# Patient Record
Sex: FEMALE | Race: WHITE | NOT HISPANIC OR LATINO | Employment: OTHER | URBAN - NONMETROPOLITAN AREA
[De-identification: names, ages, dates, MRNs, and addresses within clinical notes are randomized per-mention and may not be internally consistent; named-entity substitution may affect disease eponyms.]

---

## 2021-10-03 ENCOUNTER — HOSPITAL ENCOUNTER (EMERGENCY)
Facility: HOSPITAL | Age: 56
Discharge: LEFT BEFORE DISCHARGE | End: 2021-10-03

## 2021-10-03 VITALS
HEART RATE: 113 BPM | SYSTOLIC BLOOD PRESSURE: 128 MMHG | DIASTOLIC BLOOD PRESSURE: 87 MMHG | OXYGEN SATURATION: 92 % | TEMPERATURE: 97.7 F | RESPIRATION RATE: 12 BRPM

## 2021-10-03 LAB
QT INTERVAL: 340 MS
QTC INTERVAL: 466 MS

## 2021-10-03 PROCEDURE — 99211 OFF/OP EST MAY X REQ PHY/QHP: CPT

## 2021-10-03 PROCEDURE — 93005 ELECTROCARDIOGRAM TRACING: CPT

## 2021-10-03 PROCEDURE — 93010 ELECTROCARDIOGRAM REPORT: CPT | Performed by: INTERNAL MEDICINE

## 2021-10-03 NOTE — ED NOTES
"Pt states \" I just lost my  two weeks ago and ever since then I have been getting sick. It feels like I have the flu.\" pt says she has weakness, congestion, cough, and pain in her chest, side and back.     Noreen Altamirano  10/03/21 0755    "

## 2022-05-11 ENCOUNTER — HOSPITAL ENCOUNTER (EMERGENCY)
Facility: HOSPITAL | Age: 57
Discharge: HOME | End: 2022-05-11
Attending: EMERGENCY MEDICINE | Admitting: FAMILY MEDICINE

## 2022-05-11 ENCOUNTER — APPOINTMENT (OUTPATIENT)
Dept: GENERAL RADIOLOGY | Facility: HOSPITAL | Age: 57
End: 2022-05-11

## 2022-05-11 VITALS
WEIGHT: 175 LBS | TEMPERATURE: 98 F | BODY MASS INDEX: 33.04 KG/M2 | SYSTOLIC BLOOD PRESSURE: 133 MMHG | RESPIRATION RATE: 16 BRPM | DIASTOLIC BLOOD PRESSURE: 77 MMHG | HEART RATE: 97 BPM | OXYGEN SATURATION: 96 % | HEIGHT: 61 IN

## 2022-05-11 DIAGNOSIS — F41.9 ANXIETY: ICD-10-CM

## 2022-05-11 DIAGNOSIS — L73.2 HIDRADENITIS SUPPURATIVA: Primary | ICD-10-CM

## 2022-05-11 LAB
ALBUMIN SERPL-MCNC: 4.2 G/DL (ref 3.5–5.2)
ALBUMIN/GLOB SERPL: 1.4 G/DL
ALP SERPL-CCNC: 142 U/L (ref 39–117)
ALT SERPL W P-5'-P-CCNC: 18 U/L (ref 1–33)
ANION GAP SERPL CALCULATED.3IONS-SCNC: 12 MMOL/L (ref 5–15)
AST SERPL-CCNC: 14 U/L (ref 1–32)
BASOPHILS # BLD AUTO: 0.12 10*3/MM3 (ref 0–0.2)
BASOPHILS NFR BLD AUTO: 0.7 % (ref 0–1.5)
BILIRUB SERPL-MCNC: 0.3 MG/DL (ref 0–1.2)
BUN SERPL-MCNC: 5 MG/DL (ref 6–20)
BUN/CREAT SERPL: 6.4 (ref 7–25)
CALCIUM SPEC-SCNC: 9.4 MG/DL (ref 8.6–10.5)
CHLORIDE SERPL-SCNC: 101 MMOL/L (ref 98–107)
CO2 SERPL-SCNC: 24 MMOL/L (ref 22–29)
CREAT SERPL-MCNC: 0.78 MG/DL (ref 0.57–1)
DEPRECATED RDW RBC AUTO: 41.1 FL (ref 37–54)
EGFRCR SERPLBLD CKD-EPI 2021: 90.4 ML/MIN/1.73
EOSINOPHIL # BLD AUTO: 0.23 10*3/MM3 (ref 0–0.4)
EOSINOPHIL NFR BLD AUTO: 1.4 % (ref 0.3–6.2)
ERYTHROCYTE [DISTWIDTH] IN BLOOD BY AUTOMATED COUNT: 13.2 % (ref 12.3–15.4)
GLOBULIN UR ELPH-MCNC: 3 GM/DL
GLUCOSE SERPL-MCNC: 150 MG/DL (ref 65–99)
HCG SERPL QL: NEGATIVE
HCT VFR BLD AUTO: 42.2 % (ref 34–46.6)
HGB BLD-MCNC: 14.8 G/DL (ref 12–15.9)
HOLD SPECIMEN: NORMAL
IMM GRANULOCYTES # BLD AUTO: 0.1 10*3/MM3 (ref 0–0.05)
IMM GRANULOCYTES NFR BLD AUTO: 0.6 % (ref 0–0.5)
LYMPHOCYTES # BLD AUTO: 4.7 10*3/MM3 (ref 0.7–3.1)
LYMPHOCYTES NFR BLD AUTO: 28.3 % (ref 19.6–45.3)
MCH RBC QN AUTO: 30 PG (ref 26.6–33)
MCHC RBC AUTO-ENTMCNC: 35.1 G/DL (ref 31.5–35.7)
MCV RBC AUTO: 85.4 FL (ref 79–97)
MONOCYTES # BLD AUTO: 1.13 10*3/MM3 (ref 0.1–0.9)
MONOCYTES NFR BLD AUTO: 6.8 % (ref 5–12)
NEUTROPHILS NFR BLD AUTO: 10.35 10*3/MM3 (ref 1.7–7)
NEUTROPHILS NFR BLD AUTO: 62.2 % (ref 42.7–76)
NRBC BLD AUTO-RTO: 0 /100 WBC (ref 0–0.2)
NT-PROBNP SERPL-MCNC: 148.3 PG/ML (ref 0–900)
PLATELET # BLD AUTO: 462 10*3/MM3 (ref 140–450)
PMV BLD AUTO: 10.7 FL (ref 6–12)
POTASSIUM SERPL-SCNC: 4 MMOL/L (ref 3.5–5.2)
PROT SERPL-MCNC: 7.2 G/DL (ref 6–8.5)
RBC # BLD AUTO: 4.94 10*6/MM3 (ref 3.77–5.28)
RBC MORPH BLD: NORMAL
SMALL PLATELETS BLD QL SMEAR: NORMAL
SODIUM SERPL-SCNC: 137 MMOL/L (ref 136–145)
TROPONIN T SERPL-MCNC: <0.01 NG/ML (ref 0–0.03)
WBC MORPH BLD: NORMAL
WBC NRBC COR # BLD: 16.63 10*3/MM3 (ref 3.4–10.8)
WHOLE BLOOD HOLD COAG: NORMAL
WHOLE BLOOD HOLD SPECIMEN: NORMAL

## 2022-05-11 PROCEDURE — 85025 COMPLETE CBC W/AUTO DIFF WBC: CPT | Performed by: EMERGENCY MEDICINE

## 2022-05-11 PROCEDURE — 93005 ELECTROCARDIOGRAM TRACING: CPT

## 2022-05-11 PROCEDURE — 80053 COMPREHEN METABOLIC PANEL: CPT | Performed by: EMERGENCY MEDICINE

## 2022-05-11 PROCEDURE — 93005 ELECTROCARDIOGRAM TRACING: CPT | Performed by: EMERGENCY MEDICINE

## 2022-05-11 PROCEDURE — 84484 ASSAY OF TROPONIN QUANT: CPT | Performed by: EMERGENCY MEDICINE

## 2022-05-11 PROCEDURE — 83880 ASSAY OF NATRIURETIC PEPTIDE: CPT | Performed by: EMERGENCY MEDICINE

## 2022-05-11 PROCEDURE — 71045 X-RAY EXAM CHEST 1 VIEW: CPT

## 2022-05-11 PROCEDURE — 85007 BL SMEAR W/DIFF WBC COUNT: CPT | Performed by: EMERGENCY MEDICINE

## 2022-05-11 PROCEDURE — 93010 ELECTROCARDIOGRAM REPORT: CPT | Performed by: INTERNAL MEDICINE

## 2022-05-11 PROCEDURE — 84703 CHORIONIC GONADOTROPIN ASSAY: CPT | Performed by: EMERGENCY MEDICINE

## 2022-05-11 PROCEDURE — 99284 EMERGENCY DEPT VISIT MOD MDM: CPT

## 2022-05-11 RX ORDER — DOXYCYCLINE 100 MG/1
100 CAPSULE ORAL ONCE
Status: COMPLETED | OUTPATIENT
Start: 2022-05-11 | End: 2022-05-11

## 2022-05-11 RX ORDER — HYDROCODONE BITARTRATE AND ACETAMINOPHEN 5; 325 MG/1; MG/1
1 TABLET ORAL ONCE
Status: COMPLETED | OUTPATIENT
Start: 2022-05-11 | End: 2022-05-11

## 2022-05-11 RX ORDER — SODIUM CHLORIDE 0.9 % (FLUSH) 0.9 %
10 SYRINGE (ML) INJECTION AS NEEDED
Status: DISCONTINUED | OUTPATIENT
Start: 2022-05-11 | End: 2022-05-12 | Stop reason: HOSPADM

## 2022-05-11 RX ORDER — CLONIDINE HYDROCHLORIDE 0.2 MG/1
0.2 TABLET ORAL ONCE
Status: COMPLETED | OUTPATIENT
Start: 2022-05-11 | End: 2022-05-11

## 2022-05-11 RX ORDER — DOXYCYCLINE 100 MG/1
100 CAPSULE ORAL 2 TIMES DAILY
Qty: 20 CAPSULE | Refills: 0 | Status: SHIPPED | OUTPATIENT
Start: 2022-05-11 | End: 2022-07-22

## 2022-05-11 RX ORDER — ASPIRIN 325 MG
325 TABLET ORAL ONCE
Status: DISCONTINUED | OUTPATIENT
Start: 2022-05-11 | End: 2022-05-12 | Stop reason: HOSPADM

## 2022-05-11 RX ORDER — ALPRAZOLAM 1 MG/1
1 TABLET ORAL 3 TIMES DAILY PRN
COMMUNITY

## 2022-05-11 RX ADMIN — HYDROCODONE BITARTRATE AND ACETAMINOPHEN 1 TABLET: 5; 325 TABLET ORAL at 22:59

## 2022-05-11 RX ADMIN — CLONIDINE HYDROCHLORIDE 0.2 MG: 0.2 TABLET ORAL at 21:06

## 2022-05-11 RX ADMIN — DOXYCYCLINE 100 MG: 100 CAPSULE ORAL at 22:59

## 2022-05-12 NOTE — ED TRIAGE NOTES
Pt presents to ED with complaints of having a panic attack then left sided chest pain. Pt states pain is a sharp sensation that is intermittent. Pt states that she gets worked up then the pains start. Pt states she took 3 baby aspirin prior to coming to ED. Pt states that she had 4 boils today that popped and states that the pain from this is what made her have a panic attack. Pt asked if she has had any fever and she states that she has had chills but has not took her temp today. VSS. NAD noted.

## 2022-05-12 NOTE — ED PROVIDER NOTES
"Subjective   56 year old female presents to ER with family with complaint of \"panic attack\" today worse than her typical attacks after four \"boils\" ruptured today. She says that she has Xanax at home but she did not take any due to the severity of her attack. She has hidradenitis suppurativa but is not currently taking any medications for this. She reports boils in both armpits, groin, and in between \"butt cheeks.\" She also reports that during her panic attack that she became diaphoretic which is not typical for her and she developed a right frontal headache which she currently rates as a 1/10. She had chest pains during panic attack but denies presently.           Review of Systems   Constitutional: Positive for diaphoresis.   HENT: Negative.    Eyes: Negative.    Respiratory: Negative.    Cardiovascular: Positive for chest pain.   Gastrointestinal: Negative.    Endocrine: Negative.    Genitourinary: Negative.    Musculoskeletal: Negative.    Skin: Positive for wound.        Reports \"boils\" to both underarms, groin, and in buttocks folds that are painful and burst   Allergic/Immunologic: Negative.    Neurological: Negative.    Hematological: Negative.    Psychiatric/Behavioral: The patient is nervous/anxious.        History reviewed. No pertinent past medical history.    Allergies   Allergen Reactions   • Penicillins Hives       History reviewed. No pertinent surgical history.    History reviewed. No pertinent family history.    Social History     Socioeconomic History   • Marital status:            Objective   Physical Exam  Vitals and nursing note reviewed.   Constitutional:       General: She is not in acute distress.     Appearance: She is well-developed. She is not ill-appearing, toxic-appearing or diaphoretic.   HENT:      Head: Normocephalic.   Eyes:      Pupils: Pupils are equal, round, and reactive to light.   Cardiovascular:      Rate and Rhythm: Normal rate and regular rhythm.      Heart sounds: " Normal heart sounds.   Pulmonary:      Effort: Pulmonary effort is normal.      Breath sounds: Normal breath sounds.   Chest:      Chest wall: No tenderness.   Abdominal:      General: Bowel sounds are normal.      Palpations: Abdomen is soft.      Tenderness: There is no abdominal tenderness.   Musculoskeletal:         General: Normal range of motion.      Cervical back: Normal range of motion.   Skin:     General: Skin is warm.      Capillary Refill: Capillary refill takes less than 2 seconds.      Findings: Abscess present.      Comments: Abscesses in stages of healing noted to bilateral axilla   Neurological:      General: No focal deficit present.      Mental Status: She is alert and oriented to person, place, and time.   Psychiatric:         Mood and Affect: Mood normal. Mood is not anxious.         Behavior: Behavior normal. Behavior is not agitated.         Procedures           ED Course  ED Course as of 05/11/22 2255   Wed May 11, 2022   2116 Patient care transferred to Dr. Barraza  [HS]      ED Course User Index  [HS] Maribel Prince APRN                   Labs Reviewed   COMPREHENSIVE METABOLIC PANEL - Abnormal; Notable for the following components:       Result Value    Glucose 150 (*)     BUN 5 (*)     Alkaline Phosphatase 142 (*)     BUN/Creatinine Ratio 6.4 (*)     All other components within normal limits    Narrative:     GFR Normal >60  Chronic Kidney Disease <60  Kidney Failure <15     CBC WITH AUTO DIFFERENTIAL - Abnormal; Notable for the following components:    WBC 16.63 (*)     Platelets 462 (*)     Immature Grans % 0.6 (*)     Neutrophils, Absolute 10.35 (*)     Lymphocytes, Absolute 4.70 (*)     Monocytes, Absolute 1.13 (*)     Immature Grans, Absolute 0.10 (*)     All other components within normal limits   TROPONIN (IN-HOUSE) - Normal    Narrative:     Troponin T Reference Range:  <= 0.03 ng/mL-   Negative for AMI  >0.03 ng/mL-     Abnormal for myocardial necrosis.  Clinicians would have  to utilize clinical acumen, EKG, Troponin and serial changes to determine if it is an Acute Myocardial Infarction or myocardial injury due to an underlying chronic condition.       Results may be falsely decreased if patient taking Biotin.     BNP (IN-HOUSE) - Normal    Narrative:     Among patients with dyspnea, NT-proBNP is highly sensitive for the detection of acute congestive heart failure. In addition NT-proBNP of <300 pg/ml effectively rules out acute congestive heart failure with 99% negative predictive value.    Results may be falsely decreased if patient taking Biotin.     HCG, SERUM, QUALITATIVE - Normal   RAINBOW DRAW    Narrative:     The following orders were created for panel order [place] Draw.  Procedure                               Abnormality         Status                     ---------                               -----------         ------                     Green Top (Gel)[[id number]]                                  Final result               Lavender Top[[id number]]                                     Final result               Gold Top - SST[[id number]]                                                              Light Blue Top[[id number]]                                   Final result                 Please view results for these tests on the individual orders.   SCAN SLIDE   CBC AND DIFFERENTIAL    Narrative:     The following orders were created for panel order CBC & Differential.  Procedure                               Abnormality         Status                     ---------                               -----------         ------                     CBC Auto Differential[[id number]]        Abnormal            Final result               Scan Slide[[id number]]                                       Final result                 Please view results for these tests on the individual orders.   GREEN TOP   LAVENDER TOP   LIGHT BLUE TOP   EXTRA TUBES    Narrative:     The following orders were  created for panel order Extra Tubes.  Procedure                               Abnormality         Status                     ---------                               -----------         ------                     Davis Top[[id number]]                                                                      Please view results for these tests on the individual orders.   GRAY TOP       XR Chest 1 View   Final Result     Normal chest x-ray.      Electronically signed by:  Merlyn Meehan MD  5/11/2022 9:34 PM CDT   Workstation: [id number]                                             [facility]    Final diagnoses:   Hidradenitis suppurativa   Anxiety       ED Disposition  ED Disposition     ED Disposition   Discharge    Condition   Stable    Comment   --             Bridget [place], APRN  [address] [place] [address] [phone]          [facility]  [address] [place] [address] [phone]  In 2 days      Marisol Cummings, APRN  [address]  [address]  [facility] [address] [phone]    Schedule an appointment as soon as possible for a visit in 2 days           Medication List      New Prescriptions    doxycycline 100 MG capsule  Commonly known as: MONODOX  Take 1 capsule by mouth 2 (Two) Times a Day.           Where to Get Your Medications      These medications were sent to [facility] - [address] [place] [address] JUL[place] PATRICK [place] [facility] [place] [facility] - [phone]  - [phone] [address] JULIO PATRICK, [facility] [address]    Phone: [phone]   · doxycycline 100 MG capsule          Daniel Barraza MD  05/11/22 6499

## 2022-05-24 LAB
QT INTERVAL: 324 MS
QTC INTERVAL: 461 MS

## 2022-05-25 ENCOUNTER — OFFICE VISIT (OUTPATIENT)
Dept: FAMILY MEDICINE CLINIC | Facility: CLINIC | Age: 57
End: 2022-05-25

## 2022-05-25 VITALS
WEIGHT: 179 LBS | OXYGEN SATURATION: 98 % | BODY MASS INDEX: 33.79 KG/M2 | TEMPERATURE: 97.3 F | SYSTOLIC BLOOD PRESSURE: 130 MMHG | DIASTOLIC BLOOD PRESSURE: 94 MMHG | HEIGHT: 61 IN | HEART RATE: 97 BPM

## 2022-05-25 DIAGNOSIS — Z13.220 SCREENING CHOLESTEROL LEVEL: ICD-10-CM

## 2022-05-25 DIAGNOSIS — Z12.2 SCREENING FOR LUNG CANCER: ICD-10-CM

## 2022-05-25 DIAGNOSIS — Z11.59 NEED FOR HEPATITIS C SCREENING TEST: ICD-10-CM

## 2022-05-25 DIAGNOSIS — L73.2 HYDRADENITIS: ICD-10-CM

## 2022-05-25 DIAGNOSIS — F17.200 CURRENT EVERY DAY SMOKER: ICD-10-CM

## 2022-05-25 DIAGNOSIS — Z12.31 SCREENING MAMMOGRAM FOR BREAST CANCER: ICD-10-CM

## 2022-05-25 DIAGNOSIS — Z87.898 HISTORY OF PALPITATIONS: ICD-10-CM

## 2022-05-25 DIAGNOSIS — R73.9 HYPERGLYCEMIA: Primary | ICD-10-CM

## 2022-05-25 DIAGNOSIS — I10 PRIMARY HYPERTENSION: ICD-10-CM

## 2022-05-25 DIAGNOSIS — R06.2 WHEEZING: ICD-10-CM

## 2022-05-25 PROBLEM — F41.0 PANIC DISORDER: Status: ACTIVE | Noted: 2022-05-25

## 2022-05-25 PROCEDURE — 99203 OFFICE O/P NEW LOW 30 MIN: CPT | Performed by: STUDENT IN AN ORGANIZED HEALTH CARE EDUCATION/TRAINING PROGRAM

## 2022-05-25 RX ORDER — LISINOPRIL 10 MG/1
10 TABLET ORAL DAILY
Qty: 30 TABLET | Refills: 0 | Status: SHIPPED | OUTPATIENT
Start: 2022-05-25 | End: 2022-05-31 | Stop reason: SINTOL

## 2022-05-25 NOTE — PROGRESS NOTES
"  Family Medicine Residency  Josh Aguiar MD    Subjective:     Ivory Eduardo is a 56 y.o. female with a history of panic disorder who presents for a post ED visit.    Of note, this patient will need close follow-up.  It has been years since she has seen a primary care doctor and she has multiple care gaps as well as multiple complaints that will need to be addressed over the course of several follow-ups.  I will be seeing her again in 2 weeks and will be addressing her problems 1 at a time until we have addressed the mall.    As stated above, patient has multiple complaints.  She began with her ED visit.  She presented with symptoms consistent with panic attack and discharged with a diagnosis of anxiety.    In terms of her panic disorder, the patient is currently on Xanax.  She receives this by his psychiatrist in [place] and I informed her our office will not prescribe this medicine.  She says that she is tried medicines other than Xanax but declines any other intervention.  She says \"I have tried every medicine there is to do.\"  She will be seeing her psychiatrist and discussing her recent visit soon and an appointment.    Patient also presents with multiple sores under her arms.  Physical exam reveals findings consistent with hydradenitis suppurative.  ED also saw this on physical exam and prescribed doxycycline.  This has been a chronic issue for this patient and I will send to dermatology for further management.      She is concerned about a history of atrial fibrillation in her family and I counseled her to stop smoking.  ECG obtained during ED visit showed sinus tachycardia.  I will obtain a TSH and magnesium though due to description of palpitations. If this persists, can consider sending to cardiology for further workup though suspect this related to anxiety.     The patient is also concerned about her blood pressure.  Although her blood pressure was elevated in the ED, she was having a panic " "attack, and blood pressure in our clinic is 130/94.  However, she also shows me a sheet of all her blood pressure she has been taking at home and she has multiple values in the systolic 160s and 150s.  We will start her on low-dose lisinopril today.  Counseled her about potential side effects and she will call if there are any issues.  I will add hypertension to her problem list.    The patient is a current everyday smoker and I will add tobacco dependence to her problem list.  She smokes 2 packs a day and has smoked for the last 30 years. Will need a low-dose CT of the chest to screen for lung cancer.  Will require smoking cessation counseling in the future.    The patient also complains of occasional shortness of breath and wheezing.  In the setting of her smoking status, will send for pulmonary function testing.     Other care gaps include absence of a mammogram; she has not had a mammogram in the last 2 years but has an aunt who had breast cancer.  Will obtain screening mammogram today.  The patient has never had a colonoscopy and will need one in the future; will address in subsequent appointments.  Other care gaps include Tdap vaccine, COVID-19 vaccine, hepatitis C screen, and Pap smear.  We will assess each of these in subsequent visits due to limited time of this encounter due to her multiple complaints.    Patient had hyperglycemia in her ED visit and will obtain a hemoglobin A1c to screen for diabetes today.  We will also obtain a lipid panel due to her age to look at her cholesterol.  We will see the patient back in 2 weeks to go over lab results and address more complaints and to make sure she is able to get the abundance of test that we are attempting to get for her.    Patient declines some interventions today - telling me that she has grandchildren to take care of and would not be able to get all the labs that I am ordering today. \"I have a lot to do.\"       The following portions of the patient's " "history were reviewed and updated as appropriate: allergies, current medications, past family history, past medical history, past social history, past surgical history and problem list.    Past Medical Hx:  Past Medical History:   Diagnosis Date   • Anxiety    • Depression    • Hypertension        Past Surgical Hx:  Past Surgical History:   Procedure Laterality Date   •  SECTION     • HERNIA REPAIR         Current Meds:    Current Outpatient Medications:   •  ALPRAZolam (XANAX) 1 MG tablet, Take 1 mg by mouth., Disp: , Rfl:   •  doxycycline (MONODOX) 100 MG capsule, Take 1 capsule by mouth 2 (Two) Times a Day., Disp: 20 capsule, Rfl: 0  •  lisinopril (PRINIVIL,ZESTRIL) 10 MG tablet, Take 1 tablet by mouth Daily., Disp: 30 tablet, Rfl: 0    Allergies:  Allergies   Allergen Reactions   • Penicillins Hives       Family Hx:  Family History   Problem Relation Age of Onset   • Diabetes Mother    • Atrial fibrillation Mother    • Atrial fibrillation Sister    • Atrial fibrillation Maternal Aunt         Social History:  Social History     Socioeconomic History   • Marital status:    Tobacco Use   • Smoking status: Current Every Day Smoker     Packs/day: 2.00     Years: 15.00     Pack years: 30.00     Types: Cigarettes   • Smokeless tobacco: Never Used   Vaping Use   • Vaping Use: Never used   Substance and Sexual Activity   • 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   Respiratory: Positive for shortness of breath and wheezing.    Cardiovascular: Negative for palpitations.   Gastrointestinal: Negative for nausea and vomiting.   Genitourinary: Negative for difficulty urinating and dysuria.   Musculoskeletal: Positive for back pain.   Neurological: Negative for dizziness and light-headedness.       Objective:     /94   Pulse 97   Temp 97.3 °F (36.3 °C)   Ht 154.9 cm (61\")   Wt " 81.2 kg (179 lb)   SpO2 98%   BMI 33.82 kg/m²   Physical Exam  Constitutional:       General: She is not in acute distress.     Appearance: Normal appearance. She is obese. She is not ill-appearing, toxic-appearing or diaphoretic.   HENT:      Head: Normocephalic and atraumatic.   Pulmonary:      Effort: Pulmonary effort is normal. No respiratory distress.   Musculoskeletal:      Cervical back: Normal range of motion.   Neurological:      Mental Status: She is alert.      Gait: Gait normal.          Assessment/Plan:     Diagnoses and all orders for this visit:    1. Hyperglycemia (Primary)  Patient had elevated blood sugar at ED visit.  Will screen for diabetes.  -     Hemoglobin A1c      2. History of palpitations  She is concerned about a history of atrial fibrillation in her family and I counseled her to stop smoking.  ECG obtained during ED visit showed sinus tachycardia.  I will obtain a TSH and magnesium though due to description of palpitations. If this persists, can consider sending to cardiology for further workup though suspect this related to anxiety.   -     TSH+Free T4; Future        - Magnesium     3. Primary hypertension  The patient is also concerned about her blood pressure.  Although her blood pressure was elevated in the ED, she was having a panic attack, and blood pressure in our clinic is 130/94.  However, she also shows me a sheet of all her blood pressure she has been taking at home and she has multiple values in the systolic 160s and 150s.  We will start her on low-dose lisinopril today.  Counseled her about potential side effects and she will call if there are any issues.  I will add hypertension to her problem list.  -     lisinopril (PRINIVIL,ZESTRIL) 10 MG tablet; Take 1 tablet by mouth Daily.  Dispense: 30 tablet; Refill: 0    4. Screening cholesterol level  Will screen patient cholesterol.   -     Lipid panel; Future    5. Screening mammogram for breast cancer  She has not had a  mammogram in the last 2 years but has an aunt who had breast cancer.  Will obtain screening mammogram today.  -     Mammo Screening Bilateral With CAD; Future    6. Wheezing  The patient also complains of occasional shortness of breath and wheezing.  In the setting of her smoking status, will send for pulmonary function testing.     -     Full Pulmonary Function Test With Bronchodilator; Future    7. Current every day smoker  As above.   -     Full Pulmonary Function Test With Bronchodilator; Future    8. Hydradenitis  Patient also presents with multiple sores under her arms.  Physical exam reveals findings consistent with hydradenitis suppurative.  ED also saw this on physical exam and prescribed doxycycline.  This has been a chronic issue for this patient and I will send to dermatology for further management.    -     Ambulatory Referral to Dermatology    9. Screening for lung cancer  The patient is a current everyday smoker and I will add tobacco dependence to her problem list.  She smokes 2 packs a day and has smoked for the last 30 years. Will need a low-dose CT of the chest to screen for lung cancer.  Will require smoking cessation counseling in the future.  -     CT Chest Low Dose Wo; Future    10. Need for hepatitis C screening test  Patient needs this screening and we will screen for it.    -     Hepatitis C antibody; Future    11.  Remaining care gaps   The patient has never had a colonoscopy and will need one in the future; will address in subsequent appointments.  Other care gaps include Tdap vaccine, COVID-19 vaccine, hepatitis C screen, and Pap smear.  We will assess each of these in subsequent visits due to limited time of this encounter due to her multiple complaints.      · Rx changes: starting lisinopril        Follow-up:     Return in about 2 weeks (around 6/8/2022) for Lab Work Follow Up.    Preventative:  Health Maintenance   Topic Date Due   • MAMMOGRAM  Never done   • COLORECTAL CANCER  SCREENING  Never done   • ANNUAL PHYSICAL  Never done   • COVID-19 Vaccine (1) Never done   • TDAP/TD VACCINES (1 - Tdap) Never done   • ZOSTER VACCINE (1 of 2) Never done   • HEPATITIS C SCREENING  Never done   • PAP SMEAR  05/25/2022   • INFLUENZA VACCINE  08/01/2022   • Pneumococcal Vaccine 0-64  Aged Out       Alcohol use:  reports previous alcohol use.  Nicotine status  reports that she has been smoking cigarettes. She has a 30.00 pack-year smoking history. She has never used smokeless tobacco.     Goals    None         RISK SCORE: 4      This document has been electronically signed by Josh Aguiar MD on May 25, 2022 10:07 CDT

## 2022-05-26 ENCOUNTER — LAB (OUTPATIENT)
Dept: LAB | Facility: HOSPITAL | Age: 57
End: 2022-05-26

## 2022-05-26 DIAGNOSIS — Z87.898 HISTORY OF PALPITATIONS: ICD-10-CM

## 2022-05-26 DIAGNOSIS — Z11.59 NEED FOR HEPATITIS C SCREENING TEST: ICD-10-CM

## 2022-05-26 DIAGNOSIS — Z13.220 SCREENING CHOLESTEROL LEVEL: ICD-10-CM

## 2022-05-26 PROCEDURE — 86803 HEPATITIS C AB TEST: CPT

## 2022-05-26 PROCEDURE — 83735 ASSAY OF MAGNESIUM: CPT

## 2022-05-26 PROCEDURE — 84443 ASSAY THYROID STIM HORMONE: CPT

## 2022-05-26 PROCEDURE — 83036 HEMOGLOBIN GLYCOSYLATED A1C: CPT | Performed by: STUDENT IN AN ORGANIZED HEALTH CARE EDUCATION/TRAINING PROGRAM

## 2022-05-26 PROCEDURE — 80061 LIPID PANEL: CPT

## 2022-05-26 PROCEDURE — 36415 COLL VENOUS BLD VENIPUNCTURE: CPT | Performed by: STUDENT IN AN ORGANIZED HEALTH CARE EDUCATION/TRAINING PROGRAM

## 2022-05-26 PROCEDURE — 84439 ASSAY OF FREE THYROXINE: CPT

## 2022-05-27 LAB
CHOLEST SERPL-MCNC: 197 MG/DL (ref 0–200)
HBA1C MFR BLD: 7.2 % (ref 4.8–5.6)
HCV AB SER DONR QL: NORMAL
HDLC SERPL-MCNC: 50 MG/DL (ref 40–60)
LDLC SERPL CALC-MCNC: 118 MG/DL (ref 0–100)
LDLC/HDLC SERPL: 2.28 {RATIO}
MAGNESIUM SERPL-MCNC: 1.8 MG/DL (ref 1.6–2.6)
T4 FREE SERPL-MCNC: 1.03 NG/DL (ref 0.93–1.7)
TRIGL SERPL-MCNC: 165 MG/DL (ref 0–150)
TSH SERPL DL<=0.05 MIU/L-ACNC: 1.87 UIU/ML (ref 0.27–4.2)
VLDLC SERPL-MCNC: 29 MG/DL (ref 5–40)

## 2022-05-31 NOTE — H&P
I received a message from the lady who does our referrals that this patient had told her she experienced some tongue swelling when taking lisinopril for the first time.  Has no history of prior adverse reaction to angiotensin converting enzyme inhibitors.  Has not had trouble swallowing and symptoms have resolved since she stopped taking medicine.  We will add this to allergy list.  Patient has follow-up with me next week and we can decide on an alternative agent for her blood pressure at that time.  Patient voiced understanding and was agreeable to this plan.      This document has been electronically signed by Josh Aguiar MD on May 31, 2022 10:40 CDT

## 2022-06-15 ENCOUNTER — OFFICE VISIT (OUTPATIENT)
Dept: FAMILY MEDICINE CLINIC | Facility: CLINIC | Age: 57
End: 2022-06-15

## 2022-06-15 VITALS
DIASTOLIC BLOOD PRESSURE: 76 MMHG | WEIGHT: 179.9 LBS | TEMPERATURE: 97.8 F | OXYGEN SATURATION: 98 % | SYSTOLIC BLOOD PRESSURE: 136 MMHG | BODY MASS INDEX: 33.96 KG/M2 | HEART RATE: 94 BPM | HEIGHT: 61 IN

## 2022-06-15 DIAGNOSIS — E11.65 TYPE 2 DIABETES MELLITUS WITH HYPERGLYCEMIA, WITHOUT LONG-TERM CURRENT USE OF INSULIN: Primary | ICD-10-CM

## 2022-06-15 DIAGNOSIS — Z72.0 TOBACCO USE: ICD-10-CM

## 2022-06-15 DIAGNOSIS — M54.50 LUMBAR BACK PAIN: ICD-10-CM

## 2022-06-15 PROCEDURE — 99213 OFFICE O/P EST LOW 20 MIN: CPT | Performed by: STUDENT IN AN ORGANIZED HEALTH CARE EDUCATION/TRAINING PROGRAM

## 2022-06-15 RX ORDER — SIMVASTATIN 10 MG
10 TABLET ORAL NIGHTLY
Qty: 30 TABLET | Refills: 0 | OUTPATIENT
Start: 2022-06-15 | End: 2022-12-08

## 2022-06-15 RX ORDER — DOXYCYCLINE 100 MG/1
100 CAPSULE ORAL 2 TIMES DAILY
Qty: 20 CAPSULE | Refills: 0 | Status: CANCELLED | OUTPATIENT
Start: 2022-06-15

## 2022-06-15 RX ORDER — METFORMIN HYDROCHLORIDE 500 MG/1
500 TABLET, EXTENDED RELEASE ORAL
Qty: 30 TABLET | Refills: 0 | Status: SHIPPED | OUTPATIENT
Start: 2022-06-15 | End: 2022-07-15

## 2022-06-15 NOTE — PROGRESS NOTES
Family Medicine Residency  Gumaro Wood MD    Subjective:     Ivory Eduardo is a 56 y.o. female who presents for:    Tobacco use: Smoking 2 PPD for 30 yrs. Don't want to quit now. Will let us know if she decides on treatment    Health care maintenance: Completed labs with PCP.  A1c > 7. BMI 33    Has lower back pain for years.  Denies any trauma or MVA.  Has tried NSAIDs without much success    The following portions of the patient's history were reviewed and updated as appropriate: allergies, current medications, past family history, past medical history, past social history, past surgical history and problem list.    Past Medical Hx:  Past Medical History:   Diagnosis Date   • Anxiety    • Depression    • Hypertension        Past Surgical Hx:  Past Surgical History:   Procedure Laterality Date   •  SECTION     • HERNIA REPAIR         Current Meds:    Current Outpatient Medications:   •  ALPRAZolam (XANAX) 1 MG tablet, Take 1 mg by mouth., Disp: , Rfl:   •  doxycycline (MONODOX) 100 MG capsule, Take 1 capsule by mouth 2 (Two) Times a Day., Disp: 20 capsule, Rfl: 0  •  metFORMIN ER (GLUCOPHAGE-XR) 500 MG 24 hr tablet, Take 1 tablet by mouth Daily With Breakfast for 30 days., Disp: 30 tablet, Rfl: 0  •  simvastatin (Zocor) 10 MG tablet, Take 1 tablet by mouth Every Night for 30 days., Disp: 30 tablet, Rfl: 0    Allergies:  Allergies   Allergen Reactions   • Lisinopril Other (See Comments)     Tongue swelling and heaviness    • Penicillins Hives       Family Hx:  Family History   Problem Relation Age of Onset   • Diabetes Mother    • Atrial fibrillation Mother    • Atrial fibrillation Sister    • Atrial fibrillation Maternal Aunt         Social History:  Social History     Socioeconomic History   • Marital status:    Tobacco Use   • Smoking status: Current Every Day Smoker     Packs/day: 2.00     Years: 15.00     Pack years: 30.00     Types: Cigarettes   • Smokeless tobacco: Never Used  "  Vaping Use   • Vaping Use: Never used   Substance and Sexual Activity   • 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   HENT: Negative for facial swelling and trouble swallowing.    Eyes: Negative for photophobia and visual disturbance.   Respiratory: Positive for wheezing. Negative for apnea, chest tightness and shortness of breath.    Cardiovascular: Negative for chest pain.   Gastrointestinal: Negative for abdominal distention, diarrhea, nausea and vomiting.   Genitourinary: Negative for pelvic pain.   Musculoskeletal: Positive for back pain. Negative for arthralgias and myalgias.   Neurological: Negative for seizures and speech difficulty.   Psychiatric/Behavioral: Negative for confusion and hallucinations.       Objective:     /76   Pulse 94   Temp 97.8 °F (36.6 °C)   Ht 154.9 cm (61\")   Wt 81.6 kg (179 lb 14.4 oz)   SpO2 98%   BMI 33.99 kg/m²   Physical Exam  Constitutional:       Appearance: She is well-developed.   HENT:      Head: Normocephalic and atraumatic.      Nose: Nose normal.   Eyes:      Conjunctiva/sclera: Conjunctivae normal.      Pupils: Pupils are equal, round, and reactive to light.   Cardiovascular:      Rate and Rhythm: Normal rate and regular rhythm.      Pulses: Normal pulses.      Heart sounds: Normal heart sounds.   Pulmonary:      Effort: Pulmonary effort is normal. No respiratory distress.      Breath sounds: Wheezing present.   Abdominal:      General: Abdomen is flat. Bowel sounds are normal. There is no distension.      Palpations: Abdomen is soft.   Musculoskeletal:         General: Normal range of motion.      Cervical back: Normal range of motion and neck supple.      Thoracic back: Tenderness present.   Skin:     General: Skin is warm and dry.   Neurological:      General: No focal deficit present.      Mental Status: She is alert and " oriented to person, place, and time. Mental status is at baseline.      Cranial Nerves: No cranial nerve deficit.   Psychiatric:         Mood and Affect: Mood normal.         Behavior: Behavior normal.          Assessment/Plan:     Diagnoses and all orders for this visit:    1. Type 2 diabetes mellitus with hyperglycemia, without long-term current use of insulin (HCC) (Primary)  -     metFORMIN ER (GLUCOPHAGE-XR) 500 MG 24 hr tablet; Take 1 tablet by mouth Daily With Breakfast for 30 days.  Dispense: 30 tablet; Refill: 0  -     simvastatin (Zocor) 10 MG tablet; Take 1 tablet by mouth Every Night for 30 days.  Dispense: 30 tablet; Refill: 0    2. Lumbar back pain  -     XR Spine Lumbar 2 or 3 View; Future    3. Tobacco use      -We will start patient on metformin.  Per USPSTF guidelines with require a statin given history of diabetes and smoking history  -Lumbar back pain poorly controlled with NSAIDs.  Will obtain x-ray lumbar spine.  Patient to follow-up with PCP in 1 month for evaluation of chronic pain.  May benefit from PT or a muscle relaxer.  If that does not help, can consider pain management as a last resort  -Patient declined nicotine patch and Nicorette gum today.  Can revisit this topic at next visit    Follow-up:     Return in about 4 weeks (around 7/13/2022), or back pain.    Preventative:  Health Maintenance   Topic Date Due   • MAMMOGRAM  Never done   • COLORECTAL CANCER SCREENING  Never done   • ANNUAL PHYSICAL  Never done   • COVID-19 Vaccine (1) Never done   • Pneumococcal Vaccine 0-64 (1 - PCV) Never done   • TDAP/TD VACCINES (1 - Tdap) Never done   • ZOSTER VACCINE (1 of 2) Never done   • LUNG CANCER SCREENING  Never done   • PAP SMEAR  05/25/2022   • INFLUENZA VACCINE  10/01/2022   • HEPATITIS C SCREENING  Completed         Alcohol use:  reports previous alcohol use.  Nicotine status  reports that she has been smoking cigarettes. She has a 30.00 pack-year smoking history. She has never used  smokeless tobacco.     Goals    None         RISK SCORE: 3          PGY-3  [facility] Family Medicine Residency  This document has been electronically signed by Gumaro Wood MD on Sangeeta 15, 2022 09:23 CDT

## 2022-06-20 ENCOUNTER — TELEPHONE (OUTPATIENT)
Dept: GENERAL RADIOLOGY | Facility: HOSPITAL | Age: 57
End: 2022-06-20

## 2022-06-29 ENCOUNTER — TELEPHONE (OUTPATIENT)
Dept: CARDIOLOGY | Facility: CLINIC | Age: 57
End: 2022-06-29

## 2022-08-16 ENCOUNTER — APPOINTMENT (OUTPATIENT)
Dept: GENERAL RADIOLOGY | Facility: HOSPITAL | Age: 57
End: 2022-08-16

## 2022-08-16 ENCOUNTER — APPOINTMENT (OUTPATIENT)
Dept: CT IMAGING | Facility: HOSPITAL | Age: 57
End: 2022-08-16

## 2022-08-16 ENCOUNTER — HOSPITAL ENCOUNTER (EMERGENCY)
Facility: HOSPITAL | Age: 57
Discharge: HOME | End: 2022-08-16
Attending: EMERGENCY MEDICINE | Admitting: FAMILY MEDICINE

## 2022-08-16 VITALS
WEIGHT: 179 LBS | SYSTOLIC BLOOD PRESSURE: 150 MMHG | DIASTOLIC BLOOD PRESSURE: 88 MMHG | TEMPERATURE: 98.2 F | BODY MASS INDEX: 32.94 KG/M2 | OXYGEN SATURATION: 98 % | RESPIRATION RATE: 20 BRPM | HEIGHT: 62 IN | HEART RATE: 82 BPM

## 2022-08-16 DIAGNOSIS — S92.301A CLOSED FRACTURE OF BASE OF METATARSAL BONE OF RIGHT FOOT, INITIAL ENCOUNTER: Primary | ICD-10-CM

## 2022-08-16 PROCEDURE — 73700 CT LOWER EXTREMITY W/O DYE: CPT

## 2022-08-16 PROCEDURE — 73630 X-RAY EXAM OF FOOT: CPT

## 2022-08-16 PROCEDURE — 73610 X-RAY EXAM OF ANKLE: CPT

## 2022-08-16 PROCEDURE — 99283 EMERGENCY DEPT VISIT LOW MDM: CPT

## 2022-08-16 RX ORDER — DOXYCYCLINE HYCLATE 100 MG/1
100 CAPSULE ORAL 2 TIMES DAILY
COMMUNITY
End: 2022-12-08

## 2022-08-16 RX ORDER — HYDROCODONE BITARTRATE AND ACETAMINOPHEN 5; 325 MG/1; MG/1
1 TABLET ORAL EVERY 6 HOURS PRN
Qty: 12 TABLET | Refills: 0 | Status: SHIPPED | OUTPATIENT
Start: 2022-08-16 | End: 2022-08-18

## 2022-08-16 RX ORDER — OXYCODONE HYDROCHLORIDE AND ACETAMINOPHEN 5; 325 MG/1; MG/1
1 TABLET ORAL ONCE
Status: COMPLETED | OUTPATIENT
Start: 2022-08-16 | End: 2022-08-16

## 2022-08-16 RX ADMIN — OXYCODONE HYDROCHLORIDE AND ACETAMINOPHEN 1 TABLET: 5; 325 TABLET ORAL at 10:10

## 2022-08-16 NOTE — ED PROVIDER NOTES
"Subjective   Patient states she was stepping up on a curb and missed twisting her foot and right ankle.  She states that she is unable to bear weight on her foot at this time.  Pain is 10 out of a 10.          Review of Systems   Musculoskeletal:        Right foot and ankle pain       Past Medical History:   Diagnosis Date   • Anxiety    • COPD (chronic obstructive pulmonary disease) ([facility])    • Depression    • Hyperlipidemia    • Hypertension        Allergies   Allergen Reactions   • Lisinopril Other (See Comments)     Tongue swelling and heaviness    • Penicillins Hives       Past Surgical History:   Procedure Laterality Date   •  SECTION     • ELBOW ARTHROSCOPY     • HERNIA REPAIR         Family History   Problem Relation Age of Onset   • Diabetes Mother    • Atrial fibrillation Mother    • Atrial fibrillation Sister    • Atrial fibrillation Maternal Aunt        Social History     Socioeconomic History   • Marital status:    Tobacco Use   • Smoking status: Current Every Day Smoker     Packs/day: 2.00     Years: 15.00     Pack years: 30.00     Types: Cigarettes   • Smokeless tobacco: Never Used   • Tobacco comment: 1*800*quit*now   Vaping Use   • Vaping Use: Never used   Substance and Sexual Activity   • 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   Constitutional:       General: She is not in acute distress.     Appearance: She is not ill-appearing.   HENT:      Head: Normocephalic and atraumatic.   Cardiovascular:      Rate and Rhythm: Normal rate.      Pulses: Normal pulses.   Pulmonary:      Effort: Pulmonary effort is normal.   Musculoskeletal:         General: Swelling, tenderness and signs of injury present. No deformity. " Normal range of motion.      Comments: Generalized swelling to the top of the right foot along the lateral aspects to the lateral right ankle region.  Decreased range of motion of right ankle secondary to pain.  Strong pedal pulse.  Normal warmth to touch.  Good cap refill.   Skin:     General: Skin is warm and dry.      Capillary Refill: Capillary refill takes less than 2 seconds.   Neurological:      Mental Status: She is alert and oriented to person, place, and time.   Psychiatric:         Mood and Affect: Mood normal.         Behavior: Behavior normal.         Procedures  XR Ankle 3+ View Right    Result Date: 8/16/2022  Narrative: Three view right ankle and right foot HISTORY: Pain following injury AP, lateral and oblique views of the right ankle and right foot obtained. COMPARISON: None FINDINGS: Small anterior tibiotalar joint effusion. Comminuted minimally displaced intra-articular fracture base and proximal shaft second metatarsal. 1.4 mm fracture fragment of uncertain age arising laterally from the talus. 3.4 mm probable acute fracture fragment arising medially from the base of the first metatarsal. 2 mm and smaller fracture fragments lateral to the third cuneiform and base of the third metatarsal which may be acute. Small calcaneal spurs. Bunion formation. No dislocation. No other osseous or articular abnormality.     Impression: CONCLUSION: As above [id number] Electronically signed by:  Emiliano Ramirez MD  8/16/2022 10:18 AM CDT Workstation: [id number]    XR Foot 3+ View Right    Result Date: 8/16/2022  Narrative: Three view right ankle and right foot HISTORY: Pain following injury AP, lateral and oblique views of the right ankle and right foot obtained. COMPARISON: None FINDINGS: Small anterior tibiotalar joint effusion. Comminuted minimally displaced intra-articular fracture base and proximal shaft second metatarsal. 1.4 mm fracture fragment of uncertain age arising laterally from the talus. 3.4 mm probable  acute fracture fragment arising medially from the base of the first metatarsal. 2 mm and smaller fracture fragments lateral to the third cuneiform and base of the third metatarsal which may be acute. Small calcaneal spurs. Bunion formation. No dislocation. No other osseous or articular abnormality.     Impression: CONCLUSION: As above [id number] Electronically signed by:  Emiliano Ramirez MD  8/16/2022 10:18 AM CDT Workstation: [id number]    CT Lower Extremity Right Without Contrast    Result Date: 8/16/2022  Narrative: CT right lower extremity, foot CLINICAL INDICATION: Fractures   COMPARISON: Right foot August 16, 2022.   TECHNIQUE: Noncontrast study. Helical scanning with axial and coronal reformations. Soft tissue, lung, and bone windows reviewed. This exam was performed according to our departmental dose-optimization program, which includes automated exposure control, adjustment of the mA and/or kV according to patient size and/or use of iterative reconstruction technique. CT FINDINGS: Comminuted, severely displaced fractures of the second and third metatarsals. Nondisplaced fracture of the base of the fourth metatarsal. Small chip, displaced avulsion fracture probably arising from lateral cuneiform and small chip fracture arising from the base of the first metatarsal.     Impression: Multiple fractures as fully described above. Electronically signed by:  Nahun Duarte MD  8/16/2022 3:08 PM CDT Workstation: [id number]             ED Course  ED Course as of 08/16/22 2006   Tue Aug 16, 2022   1036 Dr. Paresh norman.  [SH]   1038 Will obtain CT scan, place posterior splint and provide crutches. Patient to follow up in office this week.  [SH]   1044 Patient updated on work up.  [SH]   1340 Patient states she needs to leave so she can get home before her grandkids get home from school.  Continue pending CT results at this time.  Splint has been placed and patient has been fitted for crutches.  We will send in antibiotics at  this time.  Patient aware to follow-up with podiatry Thursday at 1:30 8/18/22.  []   1349 [id number] [facility] reviewed.  [SH]      ED Course User Index  [SH] Iza Schmidt APRN                                           [facility]    Final diagnoses:   Closed fracture of base of metatarsal bone of right foot, initial encounter       ED Disposition  ED Disposition     ED Disposition   Discharge    Condition   Stable    Comment   --             Yony Yoder, MARCELL  [address] [place] [id number] [phone]    Schedule an appointment as soon as possible for a visit   8/18/22         Medication List      New Prescriptions    HYDROcodone-acetaminophen 5-325 MG per tablet  Commonly known as: NORCO  Take 1 tablet by mouth Every 6 (Six) Hours As Needed for Moderate Pain .           Where to Get Your Medications      These medications were sent to [facility] [address] [place] [address] [place] [facility] [place] [facility] - [phone]  - [phone] [address] JULIO PATRICK, [place] [id number]    Phone: [phone]   · HYDROcodone-acetaminophen 5-325 MG per tablet          Iza Schmidt APRN  08/16/22 2007

## 2022-08-16 NOTE — DISCHARGE INSTRUCTIONS
Follow-up with Dr. Yoder on Thursday, August 18 at 1:30 PM.  Use your crutches with nonweightbearing to your right foot.  Use your pain medications as needed for pain.  Do not drive while you are on this medication.  Return back to the ER symptoms worsen or change in presentation.

## 2022-08-16 NOTE — ED NOTES
"Patient presents to ED with C/O Right Foot Injury. Patient stated \"I was going to step up on the curb and missed somehow.I've never felt pain like this. I think it's broken.\" Patient reports being unable to bear weight and says she is unable to move Right foot due to the pain. Patient has swelling to R Foot on side and on top of her foot.   "

## 2022-08-18 ENCOUNTER — OFFICE VISIT (OUTPATIENT)
Dept: PODIATRY | Facility: CLINIC | Age: 57
End: 2022-08-18

## 2022-08-18 VITALS — BODY MASS INDEX: 32.94 KG/M2 | HEIGHT: 62 IN | WEIGHT: 179 LBS | HEART RATE: 120 BPM | OXYGEN SATURATION: 97 %

## 2022-08-18 DIAGNOSIS — S92.301A MULTIPLE CLOSED FRACTURES OF METATARSAL BONE OF RIGHT FOOT, INITIAL ENCOUNTER: Primary | ICD-10-CM

## 2022-08-18 PROCEDURE — 99203 OFFICE O/P NEW LOW 30 MIN: CPT | Performed by: PODIATRIST

## 2022-08-18 RX ORDER — OXYCODONE AND ACETAMINOPHEN 7.5; 325 MG/1; MG/1
1 TABLET ORAL EVERY 6 HOURS PRN
Qty: 30 TABLET | Refills: 0 | Status: SHIPPED | OUTPATIENT
Start: 2022-08-18 | End: 2022-09-08 | Stop reason: SDUPTHER

## 2022-08-18 NOTE — PROGRESS NOTES
Ivory Eduardo  1965  56 y.o. female    2022  Chief Complaint   Patient presents with   • Right Foot - Pain           History of Present Illness    Ivory Eduardo is a 56 y.o. female patient to the clinic today for a follow up from the ER, patient states she fell on 2022, injuring her right foot. Xray's on 2022.  Has been nonweightbearing.      Past Medical History:   Diagnosis Date   • Anxiety    • COPD (chronic obstructive pulmonary disease) (HCC)    • Depression    • Hyperlipidemia    • Hypertension          Past Surgical History:   Procedure Laterality Date   •  SECTION     • ELBOW ARTHROSCOPY     • HERNIA REPAIR           Family History   Problem Relation Age of Onset   • Diabetes Mother    • Atrial fibrillation Mother    • Atrial fibrillation Sister    • Atrial fibrillation Maternal Aunt          Social History     Socioeconomic History   • Marital status:    Tobacco Use   • Smoking status: Current Every Day Smoker     Packs/day: 2.00     Years: 15.00     Pack years: 30.00     Types: Cigarettes   • Smokeless tobacco: Never Used   • Tobacco comment: 1*800*quit*now   Vaping Use   • Vaping Use: Never used   Substance and Sexual Activity   • 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     • doxycycline (VIBRAMYCIN) 100 MG capsule Take 100 mg by mouth 2 (Two) Times a Day. Currently out of medication as of 2022     • metFORMIN ER (GLUCOPHAGE-XR) 500 MG 24 hr tablet Take 1 tablet by mouth Daily With Breakfast for 30 days. 30 tablet 0   • oxyCODONE-acetaminophen (Percocet) 7.5-325 MG per tablet Take 1 tablet by mouth Every 6 (Six) Hours As Needed for Moderate Pain  or Severe Pain . 30 tablet 0   • simvastatin (Zocor) 10 MG tablet Take 1 tablet by mouth  "Every Night for 30 days. 30 tablet 0     No current facility-administered medications for this visit.         OBJECTIVE    Pulse 120   Ht 157.5 cm (62\")   Wt 81.2 kg (179 lb)   SpO2 97%   BMI 32.74 kg/m²       Review of Systems   Constitutional: Negative.    HENT: Negative.    Eyes: Negative.    Respiratory: Negative.    Cardiovascular: Negative.    Gastrointestinal: Negative.    Endocrine: Negative.    Genitourinary: Negative.    Musculoskeletal:        Foot pain   Skin: Negative.    Allergic/Immunologic: Negative.    Neurological: Negative.    Hematological: Negative.    Psychiatric/Behavioral: Negative.          Physical Exam   Constitutional: she appears well-developed and well-nourished.   HEENT: Normocephalic. Atraumatic.  CV: No CP. RRR  Resp: Non-labored respirations.  Psychiatric: she has a normal mood and affect. her behavior is normal.         Lower Extremity Exam:  Vascular: DP/PT pulses palpable 2+.   Mild to moderate edema, right foot  Foot warm  CFT wnl  Neuro: Protective sensation intact, b/l.  DTRs intact  Integument: No open wounds or lesions.  Mild ecchymosis to dorsal midfoot on the right  No erythema, scaling  No masses  Musculoskeletal: LE muscle strength 4/5.   Gait assisted with wheelchair, walker  Ankle ROM full without pain or crepitus.   STJ ROM full without pain or crepitus  Tenderness on palpation of dorsal midfoot, right.  No gross instability or crepitus.  No significant tenderness with stressing of first ray/medial column          ASSESSMENT AND PLAN    Diagnoses and all orders for this visit:    1. Multiple closed fractures of metatarsal bone of right foot, initial encounter (Primary)  -     oxyCODONE-acetaminophen (Percocet) 7.5-325 MG per tablet; Take 1 tablet by mouth Every 6 (Six) Hours As Needed for Moderate Pain  or Severe Pain .  Dispense: 30 tablet; Refill: 0      -Radiographs, CT reviewed with patient.  -Patient does have significant comminuted fractures of the second " and third metatarsal bases however no significant dislocation or subluxation of Lisfranc articulation.  -Plan to treat conservatively with continued immobilization and nonweightbearing.  Advised 6 to 8 weeks need for nonweightbearing.  Placed in a well-padded posterior splint.  -Recheck 3 weeks for serial radiographs        This document has been electronically signed by Yony Yoder DPM on August 22, 2022 15:56 CDT       Much of this encounter note is an electronic transcription/translation of spoken language to printed text.   Yony Yoder DPM  8/22/2022  15:56 CDT

## 2022-09-08 ENCOUNTER — OFFICE VISIT (OUTPATIENT)
Dept: PODIATRY | Facility: CLINIC | Age: 57
End: 2022-09-08

## 2022-09-08 ENCOUNTER — TELEPHONE (OUTPATIENT)
Dept: PODIATRY | Facility: CLINIC | Age: 57
End: 2022-09-08

## 2022-09-08 VITALS — BODY MASS INDEX: 32.94 KG/M2 | HEIGHT: 62 IN | OXYGEN SATURATION: 97 % | WEIGHT: 179 LBS | HEART RATE: 115 BPM

## 2022-09-08 DIAGNOSIS — S92.301D MULTIPLE CLOSED FRACTURES OF METATARSAL BONE OF RIGHT FOOT WITH ROUTINE HEALING, SUBSEQUENT ENCOUNTER: Primary | ICD-10-CM

## 2022-09-08 PROCEDURE — 99213 OFFICE O/P EST LOW 20 MIN: CPT | Performed by: PODIATRIST

## 2022-09-08 RX ORDER — OXYCODONE AND ACETAMINOPHEN 7.5; 325 MG/1; MG/1
1 TABLET ORAL EVERY 6 HOURS PRN
Qty: 30 TABLET | Refills: 0 | Status: SHIPPED | OUTPATIENT
Start: 2022-09-08 | End: 2022-11-02

## 2022-09-08 NOTE — PROGRESS NOTES
Ivory Eduardo  1965  56 y.o. female       Patient came to clinic for a recheck of right foot fracture. Xray obtained today.     2022  Chief Complaint   Patient presents with   • Right Foot - Follow-up           History of Present Illness    Ivory Eduardo is a 56 y.o. female patient to the clinic today for a follow up of multiple right foot metatarsal fractures.  Date of injury 2022, she has been nonweightbearing since last visit.    Past Medical History:   Diagnosis Date   • Anxiety    • COPD (chronic obstructive pulmonary disease) (HCC)    • Depression    • Hyperlipidemia    • Hypertension          Past Surgical History:   Procedure Laterality Date   •  SECTION     • ELBOW ARTHROSCOPY     • HERNIA REPAIR           Family History   Problem Relation Age of Onset   • Diabetes Mother    • Atrial fibrillation Mother    • Atrial fibrillation Sister    • Atrial fibrillation Maternal Aunt          Social History     Socioeconomic History   • Marital status:    Tobacco Use   • Smoking status: Current Every Day Smoker     Packs/day: 2.00     Years: 15.00     Pack years: 30.00     Types: Cigarettes   • Smokeless tobacco: Never Used   • Tobacco comment: 1*800*quit*now   Vaping Use   • Vaping Use: Never used   Substance and Sexual Activity   • 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     • doxycycline (VIBRAMYCIN) 100 MG capsule Take 100 mg by mouth 2 (Two) Times a Day. Currently out of medication as of 2022     • oxyCODONE-acetaminophen (Percocet) 7.5-325 MG per tablet Take 1 tablet by mouth Every 6 (Six) Hours As Needed for Moderate Pain or Severe Pain. 30 tablet 0   • metFORMIN ER (GLUCOPHAGE-XR) 500 MG 24 hr tablet Take 1 tablet by mouth Daily With Breakfast  "for 30 days. 30 tablet 0   • simvastatin (Zocor) 10 MG tablet Take 1 tablet by mouth Every Night for 30 days. 30 tablet 0     No current facility-administered medications for this visit.         OBJECTIVE    Pulse 115   Ht 157.5 cm (62\")   Wt 81.2 kg (179 lb)   SpO2 97%   BMI 32.74 kg/m²       Review of Systems   Constitutional: Negative.    HENT: Negative.    Eyes: Negative.    Respiratory: Negative.    Cardiovascular: Negative.    Gastrointestinal: Negative.    Endocrine: Negative.    Genitourinary: Negative.    Musculoskeletal:        Foot pain   Skin: Negative.    Allergic/Immunologic: Negative.    Neurological: Negative.    Hematological: Negative.    Psychiatric/Behavioral: Negative.          Physical Exam   Constitutional: she appears well-developed and well-nourished.   HEENT: Normocephalic. Atraumatic.  CV: No CP. RRR  Resp: Non-labored respirations.  Psychiatric: she has a normal mood and affect. her behavior is normal.         Lower Extremity Exam:  Vascular: DP/PT pulses palpable 2+.   Mild  edema, right foot  Foot warm  CFT wnl  Neuro: Protective sensation intact, b/l.  DTRs intact  Integument: No open wounds or lesions.  Mild ecchymosis to dorsal midfoot on the right  No erythema, scaling  No masses  Musculoskeletal: LE muscle strength 4/5.   Gait assisted with wheelchair, walker  Ankle ROM full without pain or crepitus.   STJ ROM full without pain or crepitus  Tenderness on palpation of dorsal midfoot, right.  No gross instability or crepitus.  No significant tenderness with stressing of first ray/medial column          ASSESSMENT AND PLAN    Diagnoses and all orders for this visit:    1. Multiple closed fractures of metatarsal bone of right foot with routine healing, subsequent encounter (Primary)  -     XR Foot 3+ View Right  -     oxyCODONE-acetaminophen (Percocet) 7.5-325 MG per tablet; Take 1 tablet by mouth Every 6 (Six) Hours As Needed for Moderate Pain or Severe Pain.  Dispense: 30 tablet; " Refill: 0      -Serial radiographs ordered and reviewed.  Stable alignment noted.  -We will transition to tall cam boot, allow partial protected weightbearing as tolerated.  Recommend she continue to hold off work at this time  -Recheck 3 to 4 weeks for serial radiographs        This document has been electronically signed by Yony Yoder DPM on September 8, 2022 15:46 CDT       Much of this encounter note is an electronic transcription/translation of spoken language to printed text.   Yony Yoder DPM  9/8/2022  15:46 CDT

## 2022-09-08 NOTE — TELEPHONE ENCOUNTER
Spoke with patient and let her Dr. Yoder recommends her getting use to the cam boot since she was just non weightbearing, I told her to wait about a week to see how she does and give us a call back.

## 2022-09-08 NOTE — TELEPHONE ENCOUNTER
PT REQUESTS A CALL BACK RE WANTS TO KNOW IF SHE CAN GET A RELEASE LETTER FROM DR LOZOYA RELEASING HER TO WORK ON 9-12-22 WHICH STATES THAT SHE CAN WORK WITH RESTRICTIONS OF SITTING DOWN .  PT SAYS HER EMPLOYER HAS AGREED TO LET HER WORK IF SHE GETS THE RELEASE.  CALL BACK # [phone].  THANK YOU.

## 2022-09-12 ENCOUNTER — TELEPHONE (OUTPATIENT)
Dept: PODIATRY | Facility: CLINIC | Age: 57
End: 2022-09-12

## 2022-09-12 NOTE — TELEPHONE ENCOUNTER
PT REQUESTS A CALL BACK RE PT HAS BEEN USING CRUTCHES AND IS STANDING AND WALKING SOME.  WANTS TO KNOW IF SHE CAN GET A RETURN TO WORK WITH RESTRICTIONS.  CALL BACK # [phone].  THANK YOU.  .

## 2022-09-29 ENCOUNTER — OFFICE VISIT (OUTPATIENT)
Dept: PODIATRY | Facility: CLINIC | Age: 57
End: 2022-09-29

## 2022-09-29 VITALS — BODY MASS INDEX: 32.94 KG/M2 | HEART RATE: 68 BPM | HEIGHT: 62 IN | OXYGEN SATURATION: 97 % | WEIGHT: 179 LBS

## 2022-09-29 DIAGNOSIS — S92.301D MULTIPLE CLOSED FRACTURES OF METATARSAL BONE OF RIGHT FOOT WITH ROUTINE HEALING, SUBSEQUENT ENCOUNTER: Primary | ICD-10-CM

## 2022-09-29 PROCEDURE — 99213 OFFICE O/P EST LOW 20 MIN: CPT | Performed by: PODIATRIST

## 2022-10-19 ENCOUNTER — LAB (OUTPATIENT)
Dept: LAB | Facility: HOSPITAL | Age: 57
End: 2022-10-19

## 2022-10-19 ENCOUNTER — OFFICE VISIT (OUTPATIENT)
Dept: PODIATRY | Facility: CLINIC | Age: 57
End: 2022-10-19

## 2022-10-19 VITALS — HEIGHT: 62 IN | HEART RATE: 84 BPM | WEIGHT: 179 LBS | BODY MASS INDEX: 32.94 KG/M2 | OXYGEN SATURATION: 98 %

## 2022-10-19 DIAGNOSIS — E55.9 VITAMIN D DEFICIENCY: ICD-10-CM

## 2022-10-19 DIAGNOSIS — S92.301D MULTIPLE CLOSED FRACTURES OF METATARSAL BONE OF RIGHT FOOT WITH ROUTINE HEALING, SUBSEQUENT ENCOUNTER: Primary | ICD-10-CM

## 2022-10-19 PROCEDURE — 36415 COLL VENOUS BLD VENIPUNCTURE: CPT

## 2022-10-19 PROCEDURE — 99212 OFFICE O/P EST SF 10 MIN: CPT | Performed by: PODIATRIST

## 2022-10-19 PROCEDURE — 82306 VITAMIN D 25 HYDROXY: CPT

## 2022-10-19 NOTE — PROGRESS NOTES
Ivory Eduardo  1965  56 y.o. female      10/19/2022    Chief Complaint   Patient presents with   • Right Foot - Pain, Follow-up       History of Present Illness    Ivory Eduardo is a 56 y.o.female Present to clinic today for right foot pain. Patient fell three days ago,but originally hurt foot 2 months ago patient is in a boot.       Past Medical History:   Diagnosis Date   • Anxiety    • COPD (chronic obstructive pulmonary disease) (HCC)    • Depression    • Hyperlipidemia    • Hypertension          Past Surgical History:   Procedure Laterality Date   •  SECTION     • ELBOW ARTHROSCOPY     • HERNIA REPAIR           Family History   Problem Relation Age of Onset   • Diabetes Mother    • Atrial fibrillation Mother    • Atrial fibrillation Sister    • Atrial fibrillation Maternal Aunt        Allergies   Allergen Reactions   • Lisinopril Other (See Comments)     Tongue swelling and heaviness    • Penicillins Hives     Other reaction(s): Hives       Social History     Socioeconomic History   • Marital status:    Tobacco Use   • Smoking status: Every Day     Packs/day: 2.00     Years: 15.00     Pack years: 30.00     Types: Cigarettes   • Smokeless tobacco: Never   • Tobacco comments:     1*800*quit*now   Vaping Use   • Vaping Use: Never used   Substance and Sexual Activity   • 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     • oxyCODONE-acetaminophen (Percocet) 7.5-325 MG per tablet Take 1 tablet by mouth Every 6 (Six) Hours As Needed for Moderate Pain or Severe Pain. 30 tablet 0   • doxycycline (VIBRAMYCIN) 100 MG capsule Take 100 mg by mouth 2 (Two) Times a Day. Currently out of medication as of 2022     • metFORMIN ER (GLUCOPHAGE-XR) 500 MG 24 hr tablet Take 1 tablet by  "mouth Daily With Breakfast for 30 days. 30 tablet 0   • simvastatin (Zocor) 10 MG tablet Take 1 tablet by mouth Every Night for 30 days. 30 tablet 0   • vitamin D (ERGOCALCIFEROL) 1.25 MG (16413 UT) capsule capsule Take 1 capsule by mouth 1 (One) Time Per Week. 5 capsule 1     No current facility-administered medications for this visit.       Review of Systems   Constitutional: Negative.    HENT: Negative.    Respiratory: Negative.    Gastrointestinal: Negative.    Musculoskeletal:        Right foot pain    Skin: Negative.    Psychiatric/Behavioral: Negative.          OBJECTIVE    Pulse 84   Ht 157.5 cm (62\")   Wt 81.2 kg (179 lb)   SpO2 98%   BMI 32.74 kg/m²     Physical Exam  Vitals reviewed.   Constitutional:       General: She is not in acute distress.     Appearance: She is well-developed.   HENT:      Head: Normocephalic and atraumatic.      Nose: Nose normal.   Eyes:      Conjunctiva/sclera: Conjunctivae normal.      Pupils: Pupils are equal, round, and reactive to light.   Cardiovascular:      Pulses:           Dorsalis pedis pulses are 2+ on the right side.        Posterior tibial pulses are 2+ on the right side.   Pulmonary:      Effort: Pulmonary effort is normal. No respiratory distress.      Breath sounds: No wheezing.   Musculoskeletal:        Feet:    Feet:      Right foot:      Skin integrity: Skin integrity normal.   Skin:     General: Skin is warm and dry.      Capillary Refill: Capillary refill takes less than 2 seconds.   Neurological:      Mental Status: She is alert and oriented to person, place, and time.   Psychiatric:         Behavior: Behavior normal.         Thought Content: Thought content normal.                Procedures        ASSESSMENT AND PLAN    Diagnoses and all orders for this visit:    1. Multiple closed fractures of metatarsal bone of right foot with routine healing, subsequent encounter (Primary)  -     XR Foot 3+ View Right    2. Vitamin D deficiency  -     Vitamin D 25 " Hydroxy; Future    Other orders  -     vitamin D (ERGOCALCIFEROL) 1.25 MG (84855 UT) capsule capsule; Take 1 capsule by mouth 1 (One) Time Per Week.  Dispense: 5 capsule; Refill: 1      -Continued pain to right foot.  -Repeat radiographs taken reviewed.  Healing metatarsal base fractures  -Vitamin D lab ordered to evaluate for vitamin D deficiency  -Continue cam boot  -Recheck 3 weeks            This document has been electronically signed by Tono Ordonez DPM on October 20, 2022 08:05 CDT     10/20/2022  08:05 CDT

## 2022-10-20 ENCOUNTER — TELEPHONE (OUTPATIENT)
Dept: PODIATRY | Facility: CLINIC | Age: 57
End: 2022-10-20

## 2022-10-20 LAB — 25(OH)D3 SERPL-MCNC: 10 NG/ML (ref 30–100)

## 2022-10-20 RX ORDER — ERGOCALCIFEROL 1.25 MG/1
50000 CAPSULE ORAL WEEKLY
Qty: 5 CAPSULE | Refills: 1 | Status: SHIPPED | OUTPATIENT
Start: 2022-10-20

## 2022-10-20 NOTE — TELEPHONE ENCOUNTER
----- Message from Tono Ordonez DPM sent at 10/20/2022  8:07 AM CDT -----  Patient vitamin d very low. Called in rx. Will gee vitamin d in 6 weeks

## 2022-11-02 ENCOUNTER — OFFICE VISIT (OUTPATIENT)
Dept: PODIATRY | Facility: CLINIC | Age: 57
End: 2022-11-02

## 2022-11-02 VITALS — HEART RATE: 102 BPM | HEIGHT: 62 IN | BODY MASS INDEX: 32.94 KG/M2 | WEIGHT: 179 LBS | OXYGEN SATURATION: 96 %

## 2022-11-02 DIAGNOSIS — M80.00XG OSTEOPOROSIS WITH CURRENT PATHOLOGICAL FRACTURE WITH DELAYED HEALING, UNSPECIFIED OSTEOPOROSIS TYPE, SUBSEQUENT ENCOUNTER: ICD-10-CM

## 2022-11-02 DIAGNOSIS — S92.301D MULTIPLE CLOSED FRACTURES OF METATARSAL BONE OF RIGHT FOOT WITH ROUTINE HEALING, SUBSEQUENT ENCOUNTER: Primary | ICD-10-CM

## 2022-11-02 PROCEDURE — 99213 OFFICE O/P EST LOW 20 MIN: CPT | Performed by: PODIATRIST

## 2022-11-02 NOTE — PROGRESS NOTES
Ivory Eduardo  1965  56 y.o. female      2022    Chief Complaint   Patient presents with   • Right Foot - Follow-up       History of Present Illness    Ivory Eduardo is a 56 y.o.female Present to clinic today for follow-up.    Past Medical History:   Diagnosis Date   • Anxiety    • COPD (chronic obstructive pulmonary disease) (HCC)    • Depression    • Hyperlipidemia    • Hypertension          Past Surgical History:   Procedure Laterality Date   •  SECTION     • ELBOW ARTHROSCOPY     • HERNIA REPAIR           Family History   Problem Relation Age of Onset   • Diabetes Mother    • Atrial fibrillation Mother    • Atrial fibrillation Sister    • Atrial fibrillation Maternal Aunt        Allergies   Allergen Reactions   • Lisinopril Other (See Comments)     Tongue swelling and heaviness    • Penicillins Hives     Other reaction(s): Hives       Social History     Socioeconomic History   • Marital status:    Tobacco Use   • Smoking status: Every Day     Packs/day: 2.00     Years: 15.00     Pack years: 30.00     Types: Cigarettes   • Smokeless tobacco: Never   • Tobacco comments:     1*800*quit*now   Vaping Use   • Vaping Use: Never used   Substance and Sexual Activity   • 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     • doxycycline (VIBRAMYCIN) 100 MG capsule Take 100 mg by mouth 2 (Two) Times a Day. Currently out of medication as of 2022     • vitamin D (ERGOCALCIFEROL) 1.25 MG (04667 UT) capsule capsule Take 1 capsule by mouth 1 (One) Time Per Week. 5 capsule 1   • metFORMIN ER (GLUCOPHAGE-XR) 500 MG 24 hr tablet Take 1 tablet by mouth Daily With Breakfast for 30 days. 30 tablet 0   • simvastatin (Zocor) 10 MG tablet Take 1 tablet by mouth Every Night for 30 days.  "30 tablet 0     No current facility-administered medications for this visit.       Review of Systems   Constitutional: Negative.    HENT: Negative.    Respiratory: Negative.    Gastrointestinal: Negative.    Musculoskeletal:        Right foot pain    Skin: Negative.    Psychiatric/Behavioral: Negative.          OBJECTIVE    Pulse 102   Ht 157.5 cm (62\")   Wt 81.2 kg (179 lb)   SpO2 96%   BMI 32.74 kg/m²     Physical Exam  Vitals reviewed.   Constitutional:       General: She is not in acute distress.     Appearance: She is well-developed.   HENT:      Head: Normocephalic and atraumatic.      Nose: Nose normal.   Eyes:      Conjunctiva/sclera: Conjunctivae normal.      Pupils: Pupils are equal, round, and reactive to light.   Cardiovascular:      Pulses:           Dorsalis pedis pulses are 2+ on the right side.        Posterior tibial pulses are 2+ on the right side.   Pulmonary:      Effort: Pulmonary effort is normal. No respiratory distress.      Breath sounds: No wheezing.   Musculoskeletal:        Feet:    Feet:      Right foot:      Skin integrity: Skin integrity normal.   Skin:     General: Skin is warm and dry.      Capillary Refill: Capillary refill takes less than 2 seconds.   Neurological:      Mental Status: She is alert and oriented to person, place, and time.   Psychiatric:         Behavior: Behavior normal.         Thought Content: Thought content normal.                Procedures        ASSESSMENT AND PLAN    Diagnoses and all orders for this visit:    1. Multiple closed fractures of metatarsal bone of right foot with routine healing, subsequent encounter (Primary)  -     XR Foot 3+ View Right    2. Osteoporosis with current pathological fracture with delayed healing, unspecified osteoporosis type, subsequent encounter  -     DEXA Bone Density Axial; Future      -Continued pain to right foot.  -Repeat radiographs taken reviewed.  Healing metatarsal base fractures  -Order DEXA scan.  -Continue cam " boot  -Recheck 3 weeks            This document has been electronically signed by Tono Ordonez DPM on November 6, 2022 10:34 CST     11/6/2022  10:34 CST

## 2022-12-08 ENCOUNTER — OUTSIDE FACILITY SERVICE (OUTPATIENT)
Dept: CARDIOLOGY | Facility: CLINIC | Age: 57
End: 2022-12-08

## 2022-12-08 ENCOUNTER — TRANSCRIBE ORDERS (OUTPATIENT)
Dept: CARDIOLOGY | Facility: CLINIC | Age: 57
End: 2022-12-08

## 2022-12-08 DIAGNOSIS — R00.0 TACHYCARDIA: Primary | ICD-10-CM

## 2022-12-08 DIAGNOSIS — R00.0 TACHYCARDIA: ICD-10-CM

## 2022-12-08 PROBLEM — R50.9 RESPIRATORY ILLNESS WITH FEVER: Status: ACTIVE | Noted: 2022-12-08

## 2022-12-08 PROBLEM — J98.9 RESPIRATORY ILLNESS WITH FEVER: Status: ACTIVE | Noted: 2022-12-08

## 2022-12-08 PROCEDURE — 93010 ELECTROCARDIOGRAM REPORT: CPT | Performed by: INTERNAL MEDICINE

## 2022-12-20 ENCOUNTER — HOSPITAL ENCOUNTER (EMERGENCY)
Facility: HOSPITAL | Age: 57
Discharge: HOME | End: 2022-12-20
Attending: EMERGENCY MEDICINE | Admitting: EMERGENCY MEDICINE

## 2022-12-20 ENCOUNTER — APPOINTMENT (OUTPATIENT)
Dept: GENERAL RADIOLOGY | Facility: HOSPITAL | Age: 57
End: 2022-12-20

## 2022-12-20 VITALS
DIASTOLIC BLOOD PRESSURE: 93 MMHG | TEMPERATURE: 98.3 F | HEART RATE: 98 BPM | HEIGHT: 61 IN | RESPIRATION RATE: 18 BRPM | SYSTOLIC BLOOD PRESSURE: 155 MMHG | OXYGEN SATURATION: 100 % | WEIGHT: 183.3 LBS | BODY MASS INDEX: 34.61 KG/M2

## 2022-12-20 DIAGNOSIS — R73.9 HYPERGLYCEMIA: ICD-10-CM

## 2022-12-20 DIAGNOSIS — I16.0 HYPERTENSIVE URGENCY: Primary | ICD-10-CM

## 2022-12-20 DIAGNOSIS — R07.9 CHEST PAIN, UNSPECIFIED TYPE: ICD-10-CM

## 2022-12-20 LAB
ALBUMIN SERPL-MCNC: 4.2 G/DL (ref 3.5–5.2)
ALBUMIN/GLOB SERPL: 1.4 G/DL
ALP SERPL-CCNC: 129 U/L (ref 39–117)
ALT SERPL W P-5'-P-CCNC: 24 U/L (ref 1–33)
ANION GAP SERPL CALCULATED.3IONS-SCNC: 12 MMOL/L (ref 5–15)
AST SERPL-CCNC: 25 U/L (ref 1–32)
BASOPHILS # BLD AUTO: 0.09 10*3/MM3 (ref 0–0.2)
BASOPHILS NFR BLD AUTO: 0.8 % (ref 0–1.5)
BILIRUB SERPL-MCNC: 0.3 MG/DL (ref 0–1.2)
BUN SERPL-MCNC: 3 MG/DL (ref 6–20)
BUN/CREAT SERPL: 4.9 (ref 7–25)
CALCIUM SPEC-SCNC: 9.1 MG/DL (ref 8.6–10.5)
CHLORIDE SERPL-SCNC: 102 MMOL/L (ref 98–107)
CO2 SERPL-SCNC: 25 MMOL/L (ref 22–29)
CREAT SERPL-MCNC: 0.61 MG/DL (ref 0.57–1)
D-DIMER, QUANTITATIVE (MAD,POW, STR): 290 NG/ML (FEU) (ref 0–550)
DEPRECATED RDW RBC AUTO: 43.7 FL (ref 37–54)
EGFRCR SERPLBLD CKD-EPI 2021: 105.7 ML/MIN/1.73
EOSINOPHIL # BLD AUTO: 0.2 10*3/MM3 (ref 0–0.4)
EOSINOPHIL NFR BLD AUTO: 1.8 % (ref 0.3–6.2)
ERYTHROCYTE [DISTWIDTH] IN BLOOD BY AUTOMATED COUNT: 13.6 % (ref 12.3–15.4)
GLOBULIN UR ELPH-MCNC: 3 GM/DL
GLUCOSE SERPL-MCNC: 194 MG/DL (ref 65–99)
HCT VFR BLD AUTO: 44.2 % (ref 34–46.6)
HGB BLD-MCNC: 14.5 G/DL (ref 12–15.9)
HOLD SPECIMEN: NORMAL
HOLD SPECIMEN: NORMAL
IMM GRANULOCYTES # BLD AUTO: 0.03 10*3/MM3 (ref 0–0.05)
IMM GRANULOCYTES NFR BLD AUTO: 0.3 % (ref 0–0.5)
LIPASE SERPL-CCNC: 28 U/L (ref 13–60)
LYMPHOCYTES # BLD AUTO: 3.86 10*3/MM3 (ref 0.7–3.1)
LYMPHOCYTES NFR BLD AUTO: 34.5 % (ref 19.6–45.3)
MCH RBC QN AUTO: 28.4 PG (ref 26.6–33)
MCHC RBC AUTO-ENTMCNC: 32.8 G/DL (ref 31.5–35.7)
MCV RBC AUTO: 86.7 FL (ref 79–97)
MONOCYTES # BLD AUTO: 0.67 10*3/MM3 (ref 0.1–0.9)
MONOCYTES NFR BLD AUTO: 6 % (ref 5–12)
NEUTROPHILS NFR BLD AUTO: 56.6 % (ref 42.7–76)
NEUTROPHILS NFR BLD AUTO: 6.34 10*3/MM3 (ref 1.7–7)
NRBC BLD AUTO-RTO: 0 /100 WBC (ref 0–0.2)
NT-PROBNP SERPL-MCNC: 84.6 PG/ML (ref 0–900)
PLATELET # BLD AUTO: 456 10*3/MM3 (ref 140–450)
PMV BLD AUTO: 10.5 FL (ref 6–12)
POTASSIUM SERPL-SCNC: 3.8 MMOL/L (ref 3.5–5.2)
PROT SERPL-MCNC: 7.2 G/DL (ref 6–8.5)
RBC # BLD AUTO: 5.1 10*6/MM3 (ref 3.77–5.28)
SODIUM SERPL-SCNC: 139 MMOL/L (ref 136–145)
TROPONIN T SERPL-MCNC: <0.01 NG/ML (ref 0–0.03)
WBC NRBC COR # BLD: 11.19 10*3/MM3 (ref 3.4–10.8)
WHOLE BLOOD HOLD COAG: NORMAL
WHOLE BLOOD HOLD SPECIMEN: NORMAL

## 2022-12-20 PROCEDURE — 93005 ELECTROCARDIOGRAM TRACING: CPT | Performed by: EMERGENCY MEDICINE

## 2022-12-20 PROCEDURE — 84484 ASSAY OF TROPONIN QUANT: CPT | Performed by: EMERGENCY MEDICINE

## 2022-12-20 PROCEDURE — 93010 ELECTROCARDIOGRAM REPORT: CPT | Performed by: INTERNAL MEDICINE

## 2022-12-20 PROCEDURE — 71045 X-RAY EXAM CHEST 1 VIEW: CPT

## 2022-12-20 PROCEDURE — 83690 ASSAY OF LIPASE: CPT | Performed by: EMERGENCY MEDICINE

## 2022-12-20 PROCEDURE — 83880 ASSAY OF NATRIURETIC PEPTIDE: CPT | Performed by: EMERGENCY MEDICINE

## 2022-12-20 PROCEDURE — 36415 COLL VENOUS BLD VENIPUNCTURE: CPT

## 2022-12-20 PROCEDURE — 80053 COMPREHEN METABOLIC PANEL: CPT | Performed by: EMERGENCY MEDICINE

## 2022-12-20 PROCEDURE — 85379 FIBRIN DEGRADATION QUANT: CPT | Performed by: CHIROPRACTOR

## 2022-12-20 PROCEDURE — 99284 EMERGENCY DEPT VISIT MOD MDM: CPT

## 2022-12-20 PROCEDURE — 85025 COMPLETE CBC W/AUTO DIFF WBC: CPT | Performed by: EMERGENCY MEDICINE

## 2022-12-20 PROCEDURE — 96360 HYDRATION IV INFUSION INIT: CPT

## 2022-12-20 RX ORDER — LISINOPRIL 10 MG/1
20 TABLET ORAL DAILY
Qty: 30 TABLET | Refills: 0 | Status: SHIPPED | OUTPATIENT
Start: 2022-12-20 | End: 2022-12-20 | Stop reason: SDUPTHER

## 2022-12-20 RX ORDER — ALPRAZOLAM 1 MG/1
1 TABLET ORAL ONCE
Status: DISCONTINUED | OUTPATIENT
Start: 2022-12-20 | End: 2022-12-20

## 2022-12-20 RX ORDER — LISINOPRIL 20 MG/1
20 TABLET ORAL DAILY
Qty: 30 TABLET | Refills: 0 | Status: SHIPPED | OUTPATIENT
Start: 2022-12-20

## 2022-12-20 RX ORDER — SODIUM CHLORIDE 0.9 % (FLUSH) 0.9 %
10 SYRINGE (ML) INJECTION AS NEEDED
Status: DISCONTINUED | OUTPATIENT
Start: 2022-12-20 | End: 2022-12-20 | Stop reason: HOSPADM

## 2022-12-20 RX ADMIN — SODIUM CHLORIDE 500 ML: 9 INJECTION, SOLUTION INTRAVENOUS at 12:48

## 2022-12-20 NOTE — DISCHARGE INSTRUCTIONS
Please take the new dose of lisinopril 20 daily until you see your PCP.  Keep a blood pressure log of at home readings that you take once a day with a home unit.  Take these readings with you to your visit to your PCP.  Also advised your PCP of your elevated blood sugar on today's reading as she may want to do further work-up on this.    Please feel free to return to the ED at any time with any concerns.

## 2022-12-20 NOTE — ED PROVIDER NOTES
Subjective   History of Present Illness  57-year-old female with a CMH of anxiety, COPD, MDD, HLD, and HTN comes in ED on referral from her PCPs office due to chest pain with hypertension and alteration in EKG.  She reports that she recently got over a case of severe bronchitis however ever since then she has been experiencing some left chest wall pain.  She was placed on lisinopril 10 mg daily a few months ago however endorses that she did not start taking the medication until about 2 weeks ago.  Reportedly at her PCPs office today her blood pressure was 171/110 with a heart rate of 115.  Repeat readings in the office continued to show elevated blood pressure and tachycardia.  PCP advised her to come here to the ED. After seeing her PCP she did take her mid-morning dose of alprazolam.    Review of Systems   Constitutional: Negative for chills, fatigue and fever.   HENT: Positive for dental problem. Negative for rhinorrhea and trouble swallowing.    Respiratory: Positive for shortness of breath.    Cardiovascular: Positive for chest pain.   Gastrointestinal: Positive for diarrhea.        She thinks the diarrhea is secondary to her current prescription for doxycycline.   Genitourinary: Negative for dysuria and hematuria.   Musculoskeletal: Positive for back pain and gait problem.        Old injury to right foot causes gait alteration.   Neurological: Positive for dizziness and headaches. Negative for weakness and numbness.   Psychiatric/Behavioral: Positive for dysphoric mood. The patient is nervous/anxious.         She endorses severe anxiety and is currently worried that she might be having a heart attack and not live to help raise her grandchildren.       Past Medical History:   Diagnosis Date   • Anxiety    • COPD (chronic obstructive pulmonary disease) (HCC)    • Depression    • Hyperlipidemia    • Hypertension        Allergies   Allergen Reactions   • Penicillins Hives     Other reaction(s): Hives       Past  Surgical History:   Procedure Laterality Date   •  SECTION     • ELBOW ARTHROSCOPY     • HERNIA REPAIR         Family History   Problem Relation Age of Onset   • Diabetes Mother    • Atrial fibrillation Mother    • Atrial fibrillation Sister    • Atrial fibrillation Maternal Aunt        Social History     Socioeconomic History   • Marital status:    Tobacco Use   • Smoking status: Every Day     Packs/day: 2.00     Years: 15.00     Pack years: 30.00     Types: Cigarettes   • Smokeless tobacco: Never   • Tobacco comments:     1*800*quit*now   Vaping Use   • Vaping Use: Never used   Substance and Sexual Activity   • 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   Constitutional:       General: She is not in acute distress.     Appearance: She is not diaphoretic.   HENT:      Head: Atraumatic.   Eyes:      Extraocular Movements: Extraocular movements intact.      Pupils: Pupils are equal, round, and reactive to light.   Neck:      Thyroid: No thyromegaly.   Cardiovascular:      Rate and Rhythm: Tachycardia present.      Pulses:           Carotid pulses are 2+ on the right side and 2+ on the left side.       Radial pulses are 2+ on the right side and 2+ on the left side.        Posterior tibial pulses are 2+ on the right side and 2+ on the left side.      Heart sounds:    No systolic murmur is present.   No diastolic murmur is present.  Pulmonary:      Effort: No tachypnea or respiratory distress.      Breath sounds: No decreased breath sounds, wheezing, rhonchi or rales.   Chest:      Chest wall: No tenderness.   Abdominal:      General: Bowel sounds are normal.      Palpations: There is no mass.      Tenderness: There is no abdominal tenderness.   Musculoskeletal:      Right lower leg: No tenderness. No edema.      Left lower leg: No tenderness. No edema.   Lymphadenopathy:      Cervical: No  cervical adenopathy.   Skin:     Capillary Refill: Capillary refill takes less than 2 seconds.      Findings: No rash.   Neurological:      General: No focal deficit present.      Mental Status: She is alert and oriented to person, place, and time.   Psychiatric:         Mood and Affect: Mood is anxious.      Comments: Significant anxiety about current condition of heart.         Procedures     None      ED Course  ED Course as of 12/20/22 1338   Tue Dec 20, 2022   1216 Patient brought to ED by relative.  Triaged.  Given nature of complaint cardiac work-up started. [JM]   1230 D-dimer ordered, IV fluids started [JM]   1326 Laboratory testing resulted unremarkable except for elevation in blood glucose..  Chest x-ray no acute pathology.  Discussed results with patient in room.  She is good to go home at this time.  Has no chest pain now.  Advised her we would double the dose of the lisinopril to 20 daily and she was to schedule appointment to see her PCP in 1 week for follow-up.  She is also going to mention her hyperglycemia to her PCP on that visit.  She agrees to the plan for discharge. [JM]      ED Course User Index  [JM] Yon Nickerson MD                                           MDM  Number of Diagnoses or Management Options     Amount and/or Complexity of Data Reviewed  Clinical lab tests: reviewed  Tests in the radiology section of CPT®: ordered  Tests in the medicine section of CPT®: reviewed    Risk of Complications, Morbidity, and/or Mortality  Presenting problems: moderate  Diagnostic procedures: moderate  Management options: moderate        Final diagnoses:   Hypertensive urgency   Chest pain, unspecified type   Hyperglycemia       ED Disposition  ED Disposition     ED Disposition   Discharge    Condition   Stable    Comment   --             Leyda Schulz, APRN  [address] [id number] [phone]    Schedule an appointment as soon as possible for a visit            Medication  List      Changed    lisinopril 20 MG tablet  Commonly known as: PRINIVIL,ZESTRIL  Take 1 tablet by mouth Daily.  What changed:   · medication strength  · how much to take           Where to Get Your Medications      These medications were sent to [facility] [id number] - [place], [place] - [address] [place] [address] [address]IO PATRICK AT [facility] - [phone]  - [phone] [address] PATRICK, [facility] [address]    Phone: [phone]   · lisinopril 20 MG tablet           This document has been electronically signed by Yon Nickerson MD on December 20, 2022 13:38 CST         Yon Nickerson MD  Resident  12/20/22 1089

## 2022-12-31 LAB
QT INTERVAL: 362 MS
QTC INTERVAL: 476 MS

## 2023-01-04 ENCOUNTER — TRANSCRIBE ORDERS (OUTPATIENT)
Dept: GENERAL RADIOLOGY | Facility: CLINIC | Age: 58
End: 2023-01-04

## 2023-01-04 DIAGNOSIS — M54.6 PAIN IN THORACIC SPINE: Primary | ICD-10-CM

## 2023-01-05 ENCOUNTER — TRANSCRIBE ORDERS (OUTPATIENT)
Dept: PODIATRY | Facility: CLINIC | Age: 58
End: 2023-01-05
Payer: COMMERCIAL

## 2023-01-05 DIAGNOSIS — M79.671 RIGHT FOOT PAIN: Primary | ICD-10-CM

## 2023-01-29 ENCOUNTER — HOSPITAL ENCOUNTER (EMERGENCY)
Facility: HOSPITAL | Age: 58
Discharge: LEFT BEFORE DISCHARGE | End: 2023-01-29
Attending: FAMILY MEDICINE | Admitting: FAMILY MEDICINE
Payer: COMMERCIAL

## 2023-01-29 ENCOUNTER — APPOINTMENT (OUTPATIENT)
Dept: GENERAL RADIOLOGY | Facility: HOSPITAL | Age: 58
End: 2023-01-29
Payer: COMMERCIAL

## 2023-01-29 VITALS
OXYGEN SATURATION: 95 % | SYSTOLIC BLOOD PRESSURE: 111 MMHG | WEIGHT: 180 LBS | TEMPERATURE: 98.2 F | BODY MASS INDEX: 33.99 KG/M2 | DIASTOLIC BLOOD PRESSURE: 73 MMHG | HEART RATE: 104 BPM | RESPIRATION RATE: 19 BRPM | HEIGHT: 61 IN

## 2023-01-29 DIAGNOSIS — R07.9 CHEST PAIN IN ADULT: Primary | ICD-10-CM

## 2023-01-29 LAB
ALBUMIN SERPL-MCNC: 4.1 G/DL (ref 3.5–5.2)
ALBUMIN/GLOB SERPL: 1.4 G/DL
ALP SERPL-CCNC: 134 U/L (ref 39–117)
ALT SERPL W P-5'-P-CCNC: 35 U/L (ref 1–33)
ANION GAP SERPL CALCULATED.3IONS-SCNC: 12 MMOL/L (ref 5–15)
AST SERPL-CCNC: 44 U/L (ref 1–32)
BASOPHILS # BLD AUTO: 0.11 10*3/MM3 (ref 0–0.2)
BASOPHILS NFR BLD AUTO: 1 % (ref 0–1.5)
BILIRUB SERPL-MCNC: 0.5 MG/DL (ref 0–1.2)
BUN SERPL-MCNC: 5 MG/DL (ref 6–20)
BUN/CREAT SERPL: 6.6 (ref 7–25)
CALCIUM SPEC-SCNC: 9.2 MG/DL (ref 8.6–10.5)
CHLORIDE SERPL-SCNC: 99 MMOL/L (ref 98–107)
CO2 SERPL-SCNC: 23 MMOL/L (ref 22–29)
CREAT SERPL-MCNC: 0.76 MG/DL (ref 0.57–1)
D-DIMER, QUANTITATIVE (MAD,POW, STR): 270 NG/ML (FEU) (ref 0–550)
DEPRECATED RDW RBC AUTO: 41.3 FL (ref 37–54)
EGFRCR SERPLBLD CKD-EPI 2021: 92.7 ML/MIN/1.73
EOSINOPHIL # BLD AUTO: 0.15 10*3/MM3 (ref 0–0.4)
EOSINOPHIL NFR BLD AUTO: 1.4 % (ref 0.3–6.2)
ERYTHROCYTE [DISTWIDTH] IN BLOOD BY AUTOMATED COUNT: 13.3 % (ref 12.3–15.4)
GLOBULIN UR ELPH-MCNC: 3 GM/DL
GLUCOSE SERPL-MCNC: 239 MG/DL (ref 65–99)
HCT VFR BLD AUTO: 42.6 % (ref 34–46.6)
HGB BLD-MCNC: 14.5 G/DL (ref 12–15.9)
HOLD SPECIMEN: NORMAL
IMM GRANULOCYTES # BLD AUTO: 0.03 10*3/MM3 (ref 0–0.05)
IMM GRANULOCYTES NFR BLD AUTO: 0.3 % (ref 0–0.5)
LYMPHOCYTES # BLD AUTO: 3.22 10*3/MM3 (ref 0.7–3.1)
LYMPHOCYTES NFR BLD AUTO: 30.7 % (ref 19.6–45.3)
MCH RBC QN AUTO: 29 PG (ref 26.6–33)
MCHC RBC AUTO-ENTMCNC: 34 G/DL (ref 31.5–35.7)
MCV RBC AUTO: 85.2 FL (ref 79–97)
MONOCYTES # BLD AUTO: 0.64 10*3/MM3 (ref 0.1–0.9)
MONOCYTES NFR BLD AUTO: 6.1 % (ref 5–12)
NEUTROPHILS NFR BLD AUTO: 6.33 10*3/MM3 (ref 1.7–7)
NEUTROPHILS NFR BLD AUTO: 60.5 % (ref 42.7–76)
NRBC BLD AUTO-RTO: 0 /100 WBC (ref 0–0.2)
NT-PROBNP SERPL-MCNC: <36 PG/ML (ref 0–900)
PLATELET # BLD AUTO: 451 10*3/MM3 (ref 140–450)
PMV BLD AUTO: 10.6 FL (ref 6–12)
POTASSIUM SERPL-SCNC: 3.7 MMOL/L (ref 3.5–5.2)
PROT SERPL-MCNC: 7.1 G/DL (ref 6–8.5)
RBC # BLD AUTO: 5 10*6/MM3 (ref 3.77–5.28)
SODIUM SERPL-SCNC: 134 MMOL/L (ref 136–145)
TROPONIN T SERPL-MCNC: <0.01 NG/ML (ref 0–0.03)
WBC NRBC COR # BLD: 10.48 10*3/MM3 (ref 3.4–10.8)
WHOLE BLOOD HOLD COAG: NORMAL
WHOLE BLOOD HOLD SPECIMEN: NORMAL

## 2023-01-29 PROCEDURE — 93005 ELECTROCARDIOGRAM TRACING: CPT

## 2023-01-29 PROCEDURE — 71045 X-RAY EXAM CHEST 1 VIEW: CPT

## 2023-01-29 PROCEDURE — 83880 ASSAY OF NATRIURETIC PEPTIDE: CPT | Performed by: FAMILY MEDICINE

## 2023-01-29 PROCEDURE — 93010 ELECTROCARDIOGRAM REPORT: CPT | Performed by: INTERNAL MEDICINE

## 2023-01-29 PROCEDURE — 85379 FIBRIN DEGRADATION QUANT: CPT | Performed by: FAMILY MEDICINE

## 2023-01-29 PROCEDURE — 85025 COMPLETE CBC W/AUTO DIFF WBC: CPT | Performed by: FAMILY MEDICINE

## 2023-01-29 PROCEDURE — 80053 COMPREHEN METABOLIC PANEL: CPT | Performed by: FAMILY MEDICINE

## 2023-01-29 PROCEDURE — 93005 ELECTROCARDIOGRAM TRACING: CPT | Performed by: FAMILY MEDICINE

## 2023-01-29 PROCEDURE — 84484 ASSAY OF TROPONIN QUANT: CPT | Performed by: FAMILY MEDICINE

## 2023-01-29 PROCEDURE — 99283 EMERGENCY DEPT VISIT LOW MDM: CPT

## 2023-01-29 RX ORDER — ASPIRIN 81 MG/1
162 TABLET, CHEWABLE ORAL ONCE
Status: COMPLETED | OUTPATIENT
Start: 2023-01-29 | End: 2023-01-29

## 2023-01-29 RX ORDER — ASPIRIN 325 MG
325 TABLET ORAL ONCE
Status: DISCONTINUED | OUTPATIENT
Start: 2023-01-29 | End: 2023-01-29

## 2023-01-29 RX ORDER — HYDROCHLOROTHIAZIDE 12.5 MG/1
12.5 CAPSULE, GELATIN COATED ORAL DAILY
COMMUNITY
Start: 2023-01-04

## 2023-01-29 RX ORDER — SODIUM CHLORIDE 0.9 % (FLUSH) 0.9 %
10 SYRINGE (ML) INJECTION AS NEEDED
Status: DISCONTINUED | OUTPATIENT
Start: 2023-01-29 | End: 2023-01-29 | Stop reason: HOSPADM

## 2023-01-29 RX ADMIN — ASPIRIN 162 MG: 81 TABLET, CHEWABLE ORAL at 09:18

## 2023-02-02 LAB
QT INTERVAL: 338 MS
QTC INTERVAL: 471 MS

## 2023-06-09 ENCOUNTER — APPOINTMENT (OUTPATIENT)
Dept: CT IMAGING | Facility: HOSPITAL | Age: 58
End: 2023-06-09
Payer: COMMERCIAL

## 2023-06-09 ENCOUNTER — HOSPITAL ENCOUNTER (EMERGENCY)
Facility: HOSPITAL | Age: 58
Discharge: HOME | End: 2023-06-09
Attending: FAMILY MEDICINE
Payer: COMMERCIAL

## 2023-06-09 ENCOUNTER — APPOINTMENT (OUTPATIENT)
Dept: GENERAL RADIOLOGY | Facility: HOSPITAL | Age: 58
End: 2023-06-09
Payer: COMMERCIAL

## 2023-06-09 VITALS
TEMPERATURE: 98 F | BODY MASS INDEX: 32.2 KG/M2 | SYSTOLIC BLOOD PRESSURE: 113 MMHG | RESPIRATION RATE: 22 BRPM | HEIGHT: 62 IN | HEART RATE: 88 BPM | WEIGHT: 175 LBS | DIASTOLIC BLOOD PRESSURE: 79 MMHG | OXYGEN SATURATION: 95 %

## 2023-06-09 DIAGNOSIS — R07.9 CHEST PAIN IN ADULT: Primary | ICD-10-CM

## 2023-06-09 DIAGNOSIS — M54.12 CERVICAL RADICULAR PAIN: ICD-10-CM

## 2023-06-09 LAB
ALBUMIN SERPL-MCNC: 4 G/DL (ref 3.5–5.2)
ALBUMIN/GLOB SERPL: 1.3 G/DL
ALP SERPL-CCNC: 131 U/L (ref 39–117)
ALT SERPL W P-5'-P-CCNC: 33 U/L (ref 1–33)
ANION GAP SERPL CALCULATED.3IONS-SCNC: 14 MMOL/L (ref 5–15)
AST SERPL-CCNC: 37 U/L (ref 1–32)
BASOPHILS # BLD AUTO: 0.11 10*3/MM3 (ref 0–0.2)
BASOPHILS NFR BLD AUTO: 0.9 % (ref 0–1.5)
BILIRUB SERPL-MCNC: 0.3 MG/DL (ref 0–1.2)
BUN SERPL-MCNC: 7 MG/DL (ref 6–20)
BUN/CREAT SERPL: 10.9 (ref 7–25)
CALCIUM SPEC-SCNC: 9.3 MG/DL (ref 8.6–10.5)
CHLORIDE SERPL-SCNC: 102 MMOL/L (ref 98–107)
CO2 SERPL-SCNC: 20 MMOL/L (ref 22–29)
CREAT SERPL-MCNC: 0.64 MG/DL (ref 0.57–1)
D-DIMER, QUANTITATIVE (MAD,POW, STR): <270 NG/ML (FEU) (ref 0–550)
DEPRECATED RDW RBC AUTO: 41.9 FL (ref 37–54)
EGFRCR SERPLBLD CKD-EPI 2021: 104.5 ML/MIN/1.73
EOSINOPHIL # BLD AUTO: 0.3 10*3/MM3 (ref 0–0.4)
EOSINOPHIL NFR BLD AUTO: 2.6 % (ref 0.3–6.2)
ERYTHROCYTE [DISTWIDTH] IN BLOOD BY AUTOMATED COUNT: 13.4 % (ref 12.3–15.4)
GEN 5 2HR TROPONIN T REFLEX: <6 NG/L
GLOBULIN UR ELPH-MCNC: 3.1 GM/DL
GLUCOSE SERPL-MCNC: 282 MG/DL (ref 65–99)
HCT VFR BLD AUTO: 43 % (ref 34–46.6)
HGB BLD-MCNC: 14.4 G/DL (ref 12–15.9)
IMM GRANULOCYTES # BLD AUTO: 0.04 10*3/MM3 (ref 0–0.05)
IMM GRANULOCYTES NFR BLD AUTO: 0.3 % (ref 0–0.5)
LYMPHOCYTES # BLD AUTO: 3.61 10*3/MM3 (ref 0.7–3.1)
LYMPHOCYTES NFR BLD AUTO: 30.8 % (ref 19.6–45.3)
MCH RBC QN AUTO: 28.9 PG (ref 26.6–33)
MCHC RBC AUTO-ENTMCNC: 33.5 G/DL (ref 31.5–35.7)
MCV RBC AUTO: 86.2 FL (ref 79–97)
MONOCYTES # BLD AUTO: 0.59 10*3/MM3 (ref 0.1–0.9)
MONOCYTES NFR BLD AUTO: 5 % (ref 5–12)
NEUTROPHILS NFR BLD AUTO: 60.4 % (ref 42.7–76)
NEUTROPHILS NFR BLD AUTO: 7.06 10*3/MM3 (ref 1.7–7)
NRBC BLD AUTO-RTO: 0 /100 WBC (ref 0–0.2)
NT-PROBNP SERPL-MCNC: 38.8 PG/ML (ref 0–900)
PLATELET # BLD AUTO: 474 10*3/MM3 (ref 140–450)
PMV BLD AUTO: 11 FL (ref 6–12)
POTASSIUM SERPL-SCNC: 4 MMOL/L (ref 3.5–5.2)
PROT SERPL-MCNC: 7.1 G/DL (ref 6–8.5)
QT INTERVAL: 332 MS
QTC INTERVAL: 443 MS
RBC # BLD AUTO: 4.99 10*6/MM3 (ref 3.77–5.28)
SODIUM SERPL-SCNC: 136 MMOL/L (ref 136–145)
TROPONIN T DELTA: NORMAL
TROPONIN T SERPL HS-MCNC: 8 NG/L
WBC NRBC COR # BLD: 11.71 10*3/MM3 (ref 3.4–10.8)

## 2023-06-09 PROCEDURE — 71045 X-RAY EXAM CHEST 1 VIEW: CPT

## 2023-06-09 PROCEDURE — 85379 FIBRIN DEGRADATION QUANT: CPT | Performed by: FAMILY MEDICINE

## 2023-06-09 PROCEDURE — 72128 CT CHEST SPINE W/O DYE: CPT

## 2023-06-09 PROCEDURE — 85025 COMPLETE CBC W/AUTO DIFF WBC: CPT | Performed by: FAMILY MEDICINE

## 2023-06-09 PROCEDURE — 93010 ELECTROCARDIOGRAM REPORT: CPT | Performed by: INTERNAL MEDICINE

## 2023-06-09 PROCEDURE — 83880 ASSAY OF NATRIURETIC PEPTIDE: CPT | Performed by: FAMILY MEDICINE

## 2023-06-09 PROCEDURE — 99284 EMERGENCY DEPT VISIT MOD MDM: CPT

## 2023-06-09 PROCEDURE — 80053 COMPREHEN METABOLIC PANEL: CPT | Performed by: FAMILY MEDICINE

## 2023-06-09 PROCEDURE — 93005 ELECTROCARDIOGRAM TRACING: CPT | Performed by: FAMILY MEDICINE

## 2023-06-09 PROCEDURE — 93005 ELECTROCARDIOGRAM TRACING: CPT

## 2023-06-09 PROCEDURE — 36415 COLL VENOUS BLD VENIPUNCTURE: CPT

## 2023-06-09 PROCEDURE — 84484 ASSAY OF TROPONIN QUANT: CPT | Performed by: FAMILY MEDICINE

## 2023-06-09 PROCEDURE — 72125 CT NECK SPINE W/O DYE: CPT

## 2023-06-09 RX ORDER — SODIUM CHLORIDE 0.9 % (FLUSH) 0.9 %
10 SYRINGE (ML) INJECTION AS NEEDED
Status: DISCONTINUED | OUTPATIENT
Start: 2023-06-09 | End: 2023-06-09 | Stop reason: HOSPADM

## 2023-06-09 RX ORDER — PREDNISONE 20 MG/1
20 TABLET ORAL DAILY
Qty: 5 TABLET | Refills: 0 | Status: SHIPPED | OUTPATIENT
Start: 2023-06-09

## 2023-06-09 RX ORDER — CYCLOBENZAPRINE HCL 10 MG
10 TABLET ORAL 3 TIMES DAILY PRN
Qty: 15 TABLET | Refills: 0 | Status: SHIPPED | OUTPATIENT
Start: 2023-06-09

## 2023-06-09 NOTE — DISCHARGE INSTRUCTIONS
Results were discussed with patient.  Advised to follow-up with primary care in 3 days.  Come back to the ER symptoms get worse.

## 2023-06-09 NOTE — ED PROVIDER NOTES
Subjective   History of Present Illness  Patient is a 57 years old with past medical history of hypertension, hyperlipidemia, anxiety and COPD who presented here today because of pain on the upper back that was radiating to the neck and the head this has been going for for 5 days.  And also have pain in the left chest that goes to the jaw.  Denies any nausea or vomiting.  Denies any shortness of breath.    History provided by:  Patient   used: No      Review of Systems   All other systems reviewed and are negative.    Past Medical History:   Diagnosis Date    Anxiety     COPD (chronic obstructive pulmonary disease)     Depression     Hyperlipidemia     Hypertension        Allergies   Allergen Reactions    Penicillins Hives     Other reaction(s): Hives       Past Surgical History:   Procedure Laterality Date     SECTION      ELBOW ARTHROSCOPY      HERNIA REPAIR         Family History   Problem Relation Age of Onset    Diabetes Mother     Atrial fibrillation Mother     Atrial fibrillation Sister     Atrial fibrillation Maternal Aunt        Social History     Socioeconomic History    Marital status:    Tobacco Use    Smoking status: Every Day     Packs/day: 2.00     Years: 15.00     Pack years: 30.00     Types: Cigarettes    Smokeless tobacco: Never    Tobacco comments:     1*800*quit*now   Vaping Use    Vaping Use: Never used   Substance and Sexual Activity    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   Constitutional:       Appearance: Normal appearance.   HENT:      Head: Normocephalic and atraumatic.      Right Ear: Tympanic membrane, ear canal and external ear normal.      Left Ear: Tympanic membrane, ear canal and external ear normal.      Nose: Nose normal.   Eyes:      Extraocular Movements: Extraocular movements intact.      Conjunctiva/sclera:  Conjunctivae normal.      Pupils: Pupils are equal, round, and reactive to light.   Cardiovascular:      Rate and Rhythm: Normal rate and regular rhythm.      Pulses: Normal pulses.      Heart sounds: Normal heart sounds.   Pulmonary:      Effort: Pulmonary effort is normal.      Breath sounds: Normal breath sounds.   Abdominal:      General: Abdomen is flat. Bowel sounds are normal.      Palpations: Abdomen is soft.   Musculoskeletal:         General: Normal range of motion.      Cervical back: Normal range of motion and neck supple.   Skin:     General: Skin is warm.      Capillary Refill: Capillary refill takes less than 2 seconds.   Neurological:      General: No focal deficit present.      Mental Status: She is alert and oriented to person, place, and time.   Psychiatric:         Mood and Affect: Mood normal.         Behavior: Behavior normal.         Thought Content: Thought content normal.         Judgment: Judgment normal.       ECG 12 Lead      Date/Time: 6/9/2023 11:42 AM  Performed by: Jonathan Lew MD  Authorized by: Jonathan Lew MD   Interpreted by physician  Rhythm: sinus tachycardia  Rate: tachycardic  BPM: 107  Conduction: conduction normal  ST Segments: ST segments normal  T Waves: T waves normal  Other: no other findings  Clinical impression: abnormal ECG             ED Course           Labs Reviewed   COMPREHENSIVE METABOLIC PANEL - Abnormal; Notable for the following components:       Result Value    Glucose 282 (*)     CO2 20.0 (*)     AST (SGOT) 37 (*)     Alkaline Phosphatase 131 (*)     All other components within normal limits    Narrative:     GFR Normal >60  Chronic Kidney Disease <60  Kidney Failure <15     CBC WITH AUTO DIFFERENTIAL - Abnormal; Notable for the following components:    WBC 11.71 (*)     Platelets 474 (*)     Neutrophils, Absolute 7.06 (*)     Lymphocytes, Absolute 3.61 (*)     All other components within normal limits   TROPONIN - Normal    Narrative:   "   High Sensitive Troponin T Reference Range:  <10.0 ng/L- Negative Female for AMI  <15.0 ng/L- Negative Male for AMI  >=10 - Abnormal Female indicating possible myocardial injury.  >=15 - Abnormal Male indicating possible myocardial injury.   Clinicians would have to utilize clinical acumen, EKG, Troponin, and serial changes to determine if it is an Acute Myocardial Infarction or myocardial injury due to an underlying chronic condition.        BNP (IN-HOUSE) - Normal    Narrative:     Among patients with dyspnea, NT-proBNP is highly sensitive for the detection of acute congestive heart failure. In addition NT-proBNP of <300 pg/ml effectively rules out acute congestive heart failure with 99% negative predictive value.     D-DIMER, QUANTITATIVE - Normal    Narrative:     According to the assay 's published package insert, a normal (<500 ng/mL (FEU)) D-dimer result in conjunction with a non-high clinical probability assessment, excludes deep vein thrombosis (DVT) and pulmonary embolism (PE) with high sensitivity.    D-dimer values increase with age and this can make VTE exclusion of an older population difficult. To address this, the [employer] College [employer], based on best available evidence and recent guidelines, recommends that clinicians use age-adjusted D-dimer thresholds in patients greater than 50 years of age with: a) a low probability of PE who do not meet all Pulmonary Embolism Rule Out Criteria, or b) in those with intermediate probability of PE.   The formula for an age-adjusted D-dimer cut-off is \"age*10\".  For example, a 60 year old patient would have an age-adjusted cut-off of 600 ng/mL (FEU) and an 80 year old 800 ng/mL (FEU).     HIGH SENSITIVITIY TROPONIN T 2HR    Narrative:     High Sensitive Troponin T Reference Range:  <10.0 ng/L- Negative Female for AMI  <15.0 ng/L- Negative Male for AMI  >=10 - Abnormal Female indicating possible myocardial injury.  >=15 - Abnormal Male " indicating possible myocardial injury.   Clinicians would have to utilize clinical acumen, EKG, Troponin, and serial changes to determine if it is an Acute Myocardial Infarction or myocardial injury due to an underlying chronic condition.        CBC AND DIFFERENTIAL    Narrative:     The following orders were created for panel order CBC & Differential.  Procedure                               Abnormality         Status                     ---------                               -----------         ------                     CBC Auto Differential[[id number]]        Abnormal            Final result                 Please view results for these tests on the individual orders.       CT Cervical Spine Without Contrast   Final Result   1. Mild degenerative changes without evidence of acute osseous injury in the   cervical spine.         CT Thoracic Spine Without Contrast   Final Result   No fracture or malalignment in the thoracic spine.            XR Chest 1 View   Final Result   No significant abnormality of the chest.                                         Medical Decision Making  Patient is a 57 years old with past medical history hypertension, hyperlipidemia anxiety and depression who presented here today because of neck pain that radiates to the back of the head.  Patient troponin was done and both were negative.  D-dimer was also done which was all negative.  CT of the neck showed degenerative changes in the C-spine.  CT of the thorax was normal.  Patient was discharged home to follow the primary care doctor for possible referral to physical therapy.  Heart score is 2.    Amount and/or Complexity of Data Reviewed  Labs: ordered.  Radiology: ordered.  ECG/medicine tests: ordered and independent interpretation performed.    Risk  Prescription drug management.        Final diagnoses:   Chest pain in adult   Cervical radicular pain       ED Disposition  ED Disposition       ED Disposition   Discharge    Condition    Stable    Comment   --               No follow-up provider specified.       Medication List      No changes were made to your prescriptions during this visit.            Jonathan Lew MD  06/09/23 3138

## 2023-08-08 ENCOUNTER — OFFICE VISIT (OUTPATIENT)
Dept: CARDIOLOGY | Facility: CLINIC | Age: 58
End: 2023-08-08
Payer: COMMERCIAL

## 2023-08-08 VITALS
DIASTOLIC BLOOD PRESSURE: 88 MMHG | BODY MASS INDEX: 31.82 KG/M2 | HEIGHT: 62 IN | SYSTOLIC BLOOD PRESSURE: 120 MMHG | OXYGEN SATURATION: 99 % | WEIGHT: 172.9 LBS | HEART RATE: 86 BPM

## 2023-08-08 DIAGNOSIS — Z72.0 TOBACCO USE: ICD-10-CM

## 2023-08-08 DIAGNOSIS — R00.0 RAPID HEART RATE: Primary | ICD-10-CM

## 2023-08-08 DIAGNOSIS — I10 HYPERTENSION, UNSPECIFIED TYPE: ICD-10-CM

## 2023-08-08 DIAGNOSIS — R06.02 SHORTNESS OF BREATH: ICD-10-CM

## 2023-08-08 DIAGNOSIS — E66.2 CLASS 1 OBESITY WITH ALVEOLAR HYPOVENTILATION AND BODY MASS INDEX (BMI) OF 31.0 TO 31.9 IN ADULT, UNSPECIFIED WHETHER SERIOUS COMORBIDITY PRESENT: ICD-10-CM

## 2023-08-08 DIAGNOSIS — R07.2 PRECORDIAL PAIN: ICD-10-CM

## 2023-08-08 PROCEDURE — 99204 OFFICE O/P NEW MOD 45 MIN: CPT | Performed by: INTERNAL MEDICINE

## 2023-08-08 PROCEDURE — 1159F MED LIST DOCD IN RCRD: CPT | Performed by: INTERNAL MEDICINE

## 2023-08-08 PROCEDURE — 3074F SYST BP LT 130 MM HG: CPT | Performed by: INTERNAL MEDICINE

## 2023-08-08 PROCEDURE — 3079F DIAST BP 80-89 MM HG: CPT | Performed by: INTERNAL MEDICINE

## 2023-08-08 PROCEDURE — 1160F RVW MEDS BY RX/DR IN RCRD: CPT | Performed by: INTERNAL MEDICINE

## 2023-08-08 RX ORDER — ASPIRIN 81 MG/1
81 TABLET ORAL DAILY
Qty: 90 TABLET | Refills: 0 | Status: SHIPPED | OUTPATIENT
Start: 2023-08-08

## 2023-08-08 RX ORDER — PROPRANOLOL HCL 60 MG
60 CAPSULE, EXTENDED RELEASE 24HR ORAL DAILY
COMMUNITY
Start: 2023-07-17

## 2023-08-08 NOTE — PROGRESS NOTES
[facility]  OFFICE NOTE    Cardiovascular Medicine  Jose Mcgraw M.D., [facility]         Leyda Schulz, APRN  [address] [place] [address]    Thank you for asking me to see Ivory Eduardo for chest pain, rapid heart rate.    History of Present Illness    Ivory Eduardo is a 57 y.o. female who presents for consultation today.   She denies any prior history of coronary stents, open heart surgeries, pacemaker placement, defibrillator placement or heart rhythm problems.  She reports having intermittent left-sided chest discomfort for the past several years.  Lately, these seem to have gotten worse.  These episodes are predominantly occurred during rest and gradually fade away.  She also reports stable dyspnea on exertion.  She is a current smoker of 1-2 PPD of cigarettes.  She was referred to our office for rapid heart rates.  She denies having any sensation of palpitations or heart racing.  She does report a strong family history of atrial fibrillation.  She has not had any presyncope or syncope.  She has not had any PND, orthopnea or leg swelling.    Review of Systems - ROS  Constitution: Negative for weight gain.   HENT: Negative for congestion.    Eyes: Negative for blurred vision.   Cardiovascular: As mentioned above  Respiratory: Negative for hemoptysis.    Endocrine: Negative for polydipsia and polyuria.   Hematologic/Lymphatic: Negative for bleeding problem. Does not bruise/bleed easily.   Skin: Negative for flushing.   Musculoskeletal: Negative for neck stiffness.   Gastrointestinal: Negative for abdominal pain, nausea and vomiting.   Genitourinary: Negative for dysuria and hematuria.   Neurological: Negative for focal weakness and numbness.   Psychiatric/Behavioral: Negative for altered mental status and depression.      All other systems were reviewed and were negative.    Past Medical History:   Diagnosis Date    Anxiety     COPD (chronic  obstructive pulmonary disease)     Depression     Hyperlipidemia     Hypertension        Family History:  family history includes Atrial fibrillation in her maternal aunt, mother, and sister; Diabetes in her mother.    Social History: Denies current alcohol or illicit drug use.  She used to drink alcohol many years ago.   reports that she has been smoking cigarettes. She has a 30.00 pack-year smoking history. She has never used smokeless tobacco. She reports that she does not currently use alcohol. She reports that she does not use drugs.    Allergies:  Allergies   Allergen Reactions    Penicillins Hives     Other reaction(s): Hives         Current Outpatient Medications:     ALPRAZolam (XANAX) 1 MG tablet, Take 1 tablet by mouth 3 (Three) Times a Day As Needed., Disp: , Rfl:     hydroCHLOROthiazide (MICROZIDE) 12.5 MG capsule, Take 1 capsule by mouth Daily., Disp: , Rfl:     propranolol LA (INDERAL LA) 60 MG 24 hr capsule, Take 1 capsule by mouth Daily., Disp: , Rfl:     aspirin 81 MG EC tablet, Take 1 tablet by mouth Daily., Disp: 90 tablet, Rfl: 0    cyclobenzaprine (FLEXERIL) 10 MG tablet, Take 1 tablet by mouth 3 (Three) Times a Day As Needed for Muscle Spasms. (Patient not taking: Reported on 8/8/2023), Disp: 15 tablet, Rfl: 0    hydrOXYzine (ATARAX) 50 MG tablet, , Disp: , Rfl:     lisinopril (PRINIVIL,ZESTRIL) 20 MG tablet, Take 1 tablet by mouth Daily. (Patient not taking: Reported on 8/8/2023), Disp: 30 tablet, Rfl: 0    metFORMIN ER (GLUCOPHAGE-XR) 500 MG 24 hr tablet, Take 1 tablet by mouth Daily With Breakfast for 30 days. (Patient not taking: Reported on 8/8/2023), Disp: 30 tablet, Rfl: 0    promethazine-dextromethorphan (PROMETHAZINE-DM) 6.25-15 MG/5ML syrup, Take 5 mL by mouth 4 (Four) Times a Day As Needed for Cough. (Patient not taking: Reported on 8/8/2023), Disp: 150 mL, Rfl: 0    vitamin D (ERGOCALCIFEROL) 1.25 MG (41066 UT) capsule capsule, Take 1 capsule by mouth 1 (One) Time Per Week.  "(Patient not taking: Reported on 8/8/2023), Disp: 5 capsule, Rfl: 1    Physical Exam:  Vitals:    08/08/23 0943   BP: 120/88   BP Location: Left arm   Patient Position: Sitting   Cuff Size: Adult   Pulse: 86   SpO2: 99%   Weight: 78.4 kg (172 lb 14.4 oz)   Height: 157.5 cm (62.01\")   PainSc: 0-No pain     Current Pain Level: none  Pulse Ox: Normal  on room air  General: alert, appears stated age and cooperative     Body Habitus: Obese  HEENT: Head: Normocephalic, no lesions, without obvious abnormality.   Neuro: alert, oriented x3  JVP: Volume/Pulsation: Normal.  Normal waveforms.   Carotid Exam: no bruit normal pulsation bilaterally   Carotid Volume: normal.     Respirations: no increased work of breathing   Chest: Tenderness to palpation noted on left side of the chest    Pulmonary:No crackles or wheezes   Heart rate: normal    Heart Rhythm: regular     Heart Sounds: S1: normal  S2: normal  S3: absent      Abdomen:   Appearance: normal .  Palpation: Soft, non-tender to palpation, bowel sounds positive in all four quadrants;  Extremity: no edema.       DATA REVIEWED:     EKG. I personally reviewed and interpreted the EKG.  sinus tachycardia on 7/15/2023    ECG/EMG Results (all)       None          ---------------------------------------------------  TTE/EARLENE:      -----------------------------------------------------  CXR/Imaging:   Imaging Results (Most Recent)       None            -----------------------------------------------------  CT:   No radiology results for the last 30 days.    ----------------------------------------------------      --------------------------------------------------------------------------------------------------  LABS:     The 10-year CVD risk score (Rai et al., 2008) is: 20.3%    Values used to calculate the score:      Age: 57 years      Sex: Female      Diabetic: Yes      Tobacco smoker: Yes      Systolic Blood Pressure: 120 mmHg      Is BP treated: Yes      HDL Cholesterol: " 50 mg/dL      Total Cholesterol: 197 mg/dL         Lab Results   Component Value Date    GLUCOSE 254 (H) 07/15/2023    BUN 9 07/15/2023    CREATININE 0.84 07/15/2023    BCR 10.7 07/15/2023    K 4.0 07/15/2023    CO2 23.0 07/15/2023    CALCIUM 9.6 07/15/2023    ALBUMIN 3.9 07/15/2023    LABIL2 1.3 05/25/2018    AST 23 07/15/2023    ALT 22 07/15/2023     Lab Results   Component Value Date    WBC 14.49 (H) 07/15/2023    HGB 13.6 07/15/2023    HCT 39.2 07/15/2023    MCV 85.6 07/15/2023     07/15/2023     Lab Results   Component Value Date    CHOL 197 05/26/2022    TRIG 165 (H) 05/26/2022    HDL 50 05/26/2022     (H) 05/26/2022     Lab Results   Component Value Date    TSH 1.870 05/26/2022     Lab Results   Component Value Date    CKTOTAL 30 07/15/2023    TROPONINT 7 07/15/2023     Lab Results   Component Value Date    HGBA1C 7.20 (H) 05/26/2022     No results found for: DDIMER  Lab Results   Component Value Date    ALT 22 07/15/2023     Lab Results   Component Value Date    HGBA1C 7.20 (H) 05/26/2022     Lab Results   Component Value Date    CREATININE 0.84 07/15/2023     No results found for: IRON, TIBC, FERRITIN  No results found for: INR, PROTIME    [unfilled]    1. Rapid heart rate  She was referred to our office for further evaluation of rapid heart rates.  She denies having any palpitations or sensation of heart racing.  She reports a strong family history of atrial fibrillation.  Serum TSH and free T4 levels were normal in May 2022.  She denies current illicit drug use.  She is on Inderal therapy.  We will obtain a 1 week live cardiac monitor for further evaluation.  - Adult Transthoracic Echo Complete W/ Cont if Necessary Per Protocol; Future  - Mobile Cardiac Outpatient Telemetry; Future    2. Hypertension, unspecified type  Clinic BP was within normal limits today.  She is on Inderal and HCTZ therapy at this point.    3. Precordial pain  She has had intermittent left-sided chest for the  past several years.  These episodes are predominantly occurred at rest.  Some tenderness to palpation was noted at the described sites of discomfort today.  Initiate baby aspirin therapy.  Continue Inderal therapy.  Given the presence of coronary risk factors (diabetes mellitus, tobacco use, hypertension), we will proceed with an exercise nuclear stress test for further evaluation.  - Stress Test With Myocardial Perfusion One Day; Future  - Adult Transthoracic Echo Complete W/ Cont if Necessary Per Protocol; Future  - aspirin 81 MG EC tablet; Take 1 tablet by mouth Daily.  Dispense: 90 tablet; Refill: 0    4. Shortness of breath  - Adult Transthoracic Echo Complete W/ Cont if Necessary Per Protocol; Future    5. Tobacco use    She is a current smoker of 1-2 PPD of cigarettes.  She reports stable dyspnea on exertion.  Obtain a transthoracic echocardiogram to look for underlying structural heart disease.    6. Class 1 obesity with alveolar hypoventilation and body mass index (BMI) of 31.0 to 31.9 in adult, unspecified whether serious comorbidity present  Dietary modification was discussed.      Prevention:  BMI is >= 30 and <35. (Class 1 Obesity). The following options were offered after discussion;: referral to primary care      Ivory Eduardo  reports that she has been smoking cigarettes. She has a 30.00 pack-year smoking history. She has never used smokeless tobacco.. I have educated her on the risk of diseases from using tobacco products such as cancer, COPD, and heart disease.     I advised her to quit and she will think about it.    Return in about 3 months (around 11/8/2023).            Electronically signed by Jose Mcgraw MD on 08/08/23 at 09:56 CDT

## 2023-08-21 ENCOUNTER — TELEPHONE (OUTPATIENT)
Dept: CARDIOLOGY | Facility: CLINIC | Age: 58
End: 2023-08-21
Payer: COMMERCIAL

## 2023-08-21 NOTE — TELEPHONE ENCOUNTER
Contacted pt over to let her know that when I receive the results I would give her a call. And would work on a peer to peer from the stress test.   Advised pt to go to the er if she was having any chest pain or heart rate got high.     Voiced her understanding.       ----- Message from Cayden Aparicio sent at 8/21/2023  9:50 AM CDT -----  Regarding: callback  Contact: [phone]  They had to cancel her nuclear stress test today due to her insurance. She was also wanting her heart monitor results. Callback number is [phone]. Thanks

## 2023-08-22 ENCOUNTER — TELEPHONE (OUTPATIENT)
Dept: CARDIOLOGY | Facility: CLINIC | Age: 58
End: 2023-08-22
Payer: COMMERCIAL

## 2023-08-22 DIAGNOSIS — R07.2 PRECORDIAL PAIN: Primary | ICD-10-CM

## 2023-08-22 NOTE — TELEPHONE ENCOUNTER
Contacted pt over results and over the date and time for treadmill stress test.      Date is August 28 th at 1Pm    Voiced understanding.     ----- Message from Jose Mcgraw MD sent at 8/22/2023 10:14 AM CDT -----  Heart monitor did not show any hemodynamically significant abnormal heart rhythms.

## 2023-08-22 NOTE — TELEPHONE ENCOUNTER
Attempted to call pt not available at this time     ----- Message from Jose Mcgraw MD sent at 8/22/2023 10:14 AM CDT -----  Heart monitor did not show any hemodynamically significant abnormal heart rhythms.

## 2023-09-03 ENCOUNTER — APPOINTMENT (OUTPATIENT)
Dept: GENERAL RADIOLOGY | Facility: HOSPITAL | Age: 58
End: 2023-09-03
Payer: COMMERCIAL

## 2023-09-03 ENCOUNTER — HOSPITAL ENCOUNTER (EMERGENCY)
Facility: HOSPITAL | Age: 58
Discharge: HOME | End: 2023-09-03
Attending: EMERGENCY MEDICINE | Admitting: EMERGENCY MEDICINE
Payer: COMMERCIAL

## 2023-09-03 VITALS
SYSTOLIC BLOOD PRESSURE: 114 MMHG | HEART RATE: 68 BPM | HEIGHT: 61 IN | RESPIRATION RATE: 18 BRPM | WEIGHT: 170 LBS | OXYGEN SATURATION: 95 % | TEMPERATURE: 98.4 F | DIASTOLIC BLOOD PRESSURE: 74 MMHG | BODY MASS INDEX: 32.1 KG/M2

## 2023-09-03 DIAGNOSIS — J06.9 UPPER RESPIRATORY TRACT INFECTION, UNSPECIFIED TYPE: ICD-10-CM

## 2023-09-03 DIAGNOSIS — R07.89 CHEST WALL PAIN: Primary | ICD-10-CM

## 2023-09-03 LAB
ALBUMIN SERPL-MCNC: 3.8 G/DL (ref 3.5–5.2)
ALBUMIN/GLOB SERPL: 1.2 G/DL
ALP SERPL-CCNC: 119 U/L (ref 39–117)
ALT SERPL W P-5'-P-CCNC: 16 U/L (ref 1–33)
ANION GAP SERPL CALCULATED.3IONS-SCNC: 13 MMOL/L (ref 5–15)
AST SERPL-CCNC: 15 U/L (ref 1–32)
BACTERIA UR QL AUTO: ABNORMAL /HPF
BASOPHILS # BLD AUTO: 0.12 10*3/MM3 (ref 0–0.2)
BASOPHILS NFR BLD AUTO: 0.8 % (ref 0–1.5)
BILIRUB SERPL-MCNC: 0.6 MG/DL (ref 0–1.2)
BILIRUB UR QL STRIP: NEGATIVE
BUN SERPL-MCNC: 4 MG/DL (ref 6–20)
BUN/CREAT SERPL: 6 (ref 7–25)
CALCIUM SPEC-SCNC: 8.9 MG/DL (ref 8.6–10.5)
CHLORIDE SERPL-SCNC: 102 MMOL/L (ref 98–107)
CLARITY UR: ABNORMAL
CO2 SERPL-SCNC: 24 MMOL/L (ref 22–29)
COLOR UR: YELLOW
CREAT SERPL-MCNC: 0.67 MG/DL (ref 0.57–1)
DEPRECATED RDW RBC AUTO: 40.5 FL (ref 37–54)
EGFRCR SERPLBLD CKD-EPI 2021: 103.4 ML/MIN/1.73
EOSINOPHIL # BLD AUTO: 0.13 10*3/MM3 (ref 0–0.4)
EOSINOPHIL NFR BLD AUTO: 0.8 % (ref 0.3–6.2)
ERYTHROCYTE [DISTWIDTH] IN BLOOD BY AUTOMATED COUNT: 12.9 % (ref 12.3–15.4)
FLUAV RNA RESP QL NAA+PROBE: NOT DETECTED
FLUBV RNA RESP QL NAA+PROBE: NOT DETECTED
GEN 5 2HR TROPONIN T REFLEX: <6 NG/L
GLOBULIN UR ELPH-MCNC: 3.1 GM/DL
GLUCOSE SERPL-MCNC: 266 MG/DL (ref 65–99)
GLUCOSE UR STRIP-MCNC: ABNORMAL MG/DL
HCT VFR BLD AUTO: 41.2 % (ref 34–46.6)
HGB BLD-MCNC: 14.2 G/DL (ref 12–15.9)
HGB UR QL STRIP.AUTO: ABNORMAL
HOLD SPECIMEN: NORMAL
HYALINE CASTS UR QL AUTO: ABNORMAL /LPF
IMM GRANULOCYTES # BLD AUTO: 0.04 10*3/MM3 (ref 0–0.05)
IMM GRANULOCYTES NFR BLD AUTO: 0.3 % (ref 0–0.5)
KETONES UR QL STRIP: NEGATIVE
LEUKOCYTE ESTERASE UR QL STRIP.AUTO: NEGATIVE
LIPASE SERPL-CCNC: 20 U/L (ref 13–60)
LYMPHOCYTES # BLD AUTO: 2.52 10*3/MM3 (ref 0.7–3.1)
LYMPHOCYTES NFR BLD AUTO: 16.5 % (ref 19.6–45.3)
MAGNESIUM SERPL-MCNC: 2.1 MG/DL (ref 1.6–2.6)
MCH RBC QN AUTO: 29.8 PG (ref 26.6–33)
MCHC RBC AUTO-ENTMCNC: 34.5 G/DL (ref 31.5–35.7)
MCV RBC AUTO: 86.4 FL (ref 79–97)
MONOCYTES # BLD AUTO: 0.89 10*3/MM3 (ref 0.1–0.9)
MONOCYTES NFR BLD AUTO: 5.8 % (ref 5–12)
NEUTROPHILS NFR BLD AUTO: 11.61 10*3/MM3 (ref 1.7–7)
NEUTROPHILS NFR BLD AUTO: 75.8 % (ref 42.7–76)
NITRITE UR QL STRIP: NEGATIVE
NRBC BLD AUTO-RTO: 0 /100 WBC (ref 0–0.2)
NT-PROBNP SERPL-MCNC: 236.4 PG/ML (ref 0–900)
PH UR STRIP.AUTO: 6.5 [PH] (ref 5–9)
PLATELET # BLD AUTO: 440 10*3/MM3 (ref 140–450)
PMV BLD AUTO: 11 FL (ref 6–12)
POTASSIUM SERPL-SCNC: 3.6 MMOL/L (ref 3.5–5.2)
PROT SERPL-MCNC: 6.9 G/DL (ref 6–8.5)
PROT UR QL STRIP: NEGATIVE
QT INTERVAL: 380 MS
QTC INTERVAL: 452 MS
RBC # BLD AUTO: 4.77 10*6/MM3 (ref 3.77–5.28)
RBC # UR STRIP: ABNORMAL /HPF
REF LAB TEST METHOD: ABNORMAL
SARS-COV-2 RNA RESP QL NAA+PROBE: NOT DETECTED
SODIUM SERPL-SCNC: 139 MMOL/L (ref 136–145)
SP GR UR STRIP: 1.01 (ref 1–1.03)
SQUAMOUS #/AREA URNS HPF: ABNORMAL /HPF
TROPONIN T DELTA: NORMAL
TROPONIN T SERPL HS-MCNC: <6 NG/L
UROBILINOGEN UR QL STRIP: ABNORMAL
WBC # UR STRIP: ABNORMAL /HPF
WBC NRBC COR # BLD: 15.31 10*3/MM3 (ref 3.4–10.8)
WHOLE BLOOD HOLD COAG: NORMAL

## 2023-09-03 PROCEDURE — 36415 COLL VENOUS BLD VENIPUNCTURE: CPT

## 2023-09-03 PROCEDURE — 81001 URINALYSIS AUTO W/SCOPE: CPT | Performed by: EMERGENCY MEDICINE

## 2023-09-03 PROCEDURE — 84484 ASSAY OF TROPONIN QUANT: CPT | Performed by: EMERGENCY MEDICINE

## 2023-09-03 PROCEDURE — 83880 ASSAY OF NATRIURETIC PEPTIDE: CPT | Performed by: EMERGENCY MEDICINE

## 2023-09-03 PROCEDURE — 80053 COMPREHEN METABOLIC PANEL: CPT | Performed by: EMERGENCY MEDICINE

## 2023-09-03 PROCEDURE — 71046 X-RAY EXAM CHEST 2 VIEWS: CPT

## 2023-09-03 PROCEDURE — 85025 COMPLETE CBC W/AUTO DIFF WBC: CPT | Performed by: EMERGENCY MEDICINE

## 2023-09-03 PROCEDURE — 87636 SARSCOV2 & INF A&B AMP PRB: CPT | Performed by: EMERGENCY MEDICINE

## 2023-09-03 PROCEDURE — 93010 ELECTROCARDIOGRAM REPORT: CPT | Performed by: INTERNAL MEDICINE

## 2023-09-03 PROCEDURE — 25010000002 KETOROLAC TROMETHAMINE PER 15 MG: Performed by: EMERGENCY MEDICINE

## 2023-09-03 PROCEDURE — 83690 ASSAY OF LIPASE: CPT | Performed by: EMERGENCY MEDICINE

## 2023-09-03 PROCEDURE — 96374 THER/PROPH/DIAG INJ IV PUSH: CPT

## 2023-09-03 PROCEDURE — 99284 EMERGENCY DEPT VISIT MOD MDM: CPT

## 2023-09-03 PROCEDURE — 93005 ELECTROCARDIOGRAM TRACING: CPT | Performed by: EMERGENCY MEDICINE

## 2023-09-03 PROCEDURE — 83735 ASSAY OF MAGNESIUM: CPT | Performed by: EMERGENCY MEDICINE

## 2023-09-03 RX ORDER — KETOROLAC TROMETHAMINE 15 MG/ML
15 INJECTION, SOLUTION INTRAMUSCULAR; INTRAVENOUS ONCE
Status: COMPLETED | OUTPATIENT
Start: 2023-09-03 | End: 2023-09-03

## 2023-09-03 RX ADMIN — KETOROLAC TROMETHAMINE 15 MG: 15 INJECTION, SOLUTION INTRAMUSCULAR; INTRAVENOUS at 11:27

## 2023-09-03 NOTE — ED PROVIDER NOTES
Subjective   History of Present Illness  This is a 57-year-old female who presents for evaluation of chest pain.  The patient states that the pain is in the left upper anterior aspect of her chest and is nonradiating.  Nothing seems to make better.  Worse with putting pressure on the area.  She has had some recent cough and congestion symptoms.  Denies fevers.  Denies productive cough or hemoptysis.  Denies abdominal pain or nausea vomiting.  Denies back pain.  Denies traumatic injury.  The patient has seen cardiology recently for similar symptoms and has a stress test set up for Tuesday.      Review of Systems   All other systems reviewed and are negative.    Past Medical History:   Diagnosis Date    Anxiety     COPD (chronic obstructive pulmonary disease)     Depression     Hyperlipidemia     Hypertension        Allergies   Allergen Reactions    Penicillins Hives     Other reaction(s): Hives       Past Surgical History:   Procedure Laterality Date     SECTION      ELBOW ARTHROSCOPY      HERNIA REPAIR         Family History   Problem Relation Age of Onset    Diabetes Mother     Atrial fibrillation Mother     Atrial fibrillation Sister     Atrial fibrillation Maternal Aunt        Social History     Socioeconomic History    Marital status:    Tobacco Use    Smoking status: Every Day     Packs/day: 2.00     Years: 15.00     Pack years: 30.00     Types: Cigarettes    Smokeless tobacco: Never    Tobacco comments:     1*800*quit*now   Vaping Use    Vaping Use: Never used   Substance and Sexual Activity    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   Constitutional:       General: She is not in acute distress.     Appearance: She is obese.   HENT:      Head: Normocephalic.   Neck:      Vascular: No JVD.   Cardiovascular:      Rate and Rhythm: Normal rate and regular rhythm.       "Pulses:           Radial pulses are 2+ on the right side and 2+ on the left side.      Heart sounds: Normal heart sounds.   Pulmonary:      Effort: Pulmonary effort is normal.      Breath sounds: Normal breath sounds.   Chest:      Chest wall: Tenderness present.      Comments: Exquisitely reproducible tenderness to palpation at the junction of the left axilla and left upper outer quadrant of the breast.  Abdominal:      Palpations: Abdomen is soft.      Tenderness: There is no abdominal tenderness.   Musculoskeletal:      Right lower leg: No tenderness.      Left lower leg: No tenderness.   Skin:     General: Skin is warm and dry.   Neurological:      Mental Status: She is alert and oriented to person, place, and time.   Psychiatric:         Mood and Affect: Mood is anxious.       Procedures           ED Course  ED Course as of 09/03/23 1827   Sun Sep 03, 2023   0912 CBC & Differential(!)  Leukocytosis present on 3 separate tests over the past 2 months to a similar extent. [JV]   1010 Urinalysis, Microscopic Only - Urine, Clean Catch(!)  Doubt UTI [JV]      ED Course User Index  [JV] Nav Longo DO                      HEART Score: 3              FARIDEH reviewed by Nav Longo DO       Medical Decision Making  The patient's recent past medical charts for this facility as well as outside facilities via the \"care everywhere\" application of epic reviewed.  The patient saw Dr. Mcgraw last month for palpitations shortness of breath and chest discomfort with multiple noted cardiac risk factors.  The patient's chart indicates that she was scheduled to have echo and stress test performed September 5.  Recent prior Holter monitoring was benign.    The differential diagnosis includes COVID, acute coronary syndrome, pneumonia, and pancreatitis, among others.    On final reevaluation the patient is in stable condition.  We discussed discharge instructions and need for follow-up.  We discussed reasons for early return to " the emergency department.        Problems Addressed:  Chest wall pain: complicated acute illness or injury  Upper respiratory tract infection, unspecified type: complicated acute illness or injury    Amount and/or Complexity of Data Reviewed  Labs: ordered. Decision-making details documented in ED Course.  Radiology: ordered.  ECG/medicine tests: ordered and independent interpretation performed.     Details: EKG interpretation: Interpreted myself.  Normal sinus rhythm.  Rate 85.  Normal P wave and ID interval.  Normal QRS and axis.  Normal QTc interval.  ST segments are normal.  No significant change from resting EKG dated 6-9-2023.    Risk  OTC drugs.  Prescription drug management.        Final diagnoses:   Chest wall pain   Upper respiratory tract infection, unspecified type       ED Disposition  ED Disposition       ED Disposition   Discharge    Condition   Stable    Comment   --               Leyda Domingeuz, APRN  [address] [address]  [phone]      As needed    [facility] EMERGENCY DEPARTMENT  [address]  [facility] [address] [phone]    As needed, If symptoms worsen at any time    Jose Mcgraw MD  [address] [place] [address] DR  [facility] [address] [place] [address]  [phone]      Please go to your stress test on Tuesday, September 5.  Follow-up with Dr. Mcgraw as planned.         Medication List      No changes were made to your prescriptions during this visit.            Nav Longo,   09/03/23 1826

## 2023-09-03 NOTE — Clinical Note
[facility] EMERGENCY DEPARTMENT  [address] [place] [id number]  Phone: [phone]    Ivory Eduardo was seen and treated in our emergency department on 9/3/2023.  She may return to work on 09/06/2023.         Thank you for choosing [facility].    Nav Longo, DO

## 2023-09-07 ENCOUNTER — TELEPHONE (OUTPATIENT)
Dept: CARDIOLOGY | Facility: CLINIC | Age: 58
End: 2023-09-07
Payer: COMMERCIAL

## 2023-09-07 NOTE — TELEPHONE ENCOUNTER
Contacted pt to let her know that we have rescheduled her stress test. It is for October 16th at 1PM.       Voiced her understanding.